# Patient Record
Sex: MALE | Race: ASIAN | NOT HISPANIC OR LATINO | Employment: OTHER | URBAN - METROPOLITAN AREA
[De-identification: names, ages, dates, MRNs, and addresses within clinical notes are randomized per-mention and may not be internally consistent; named-entity substitution may affect disease eponyms.]

---

## 2021-01-17 ENCOUNTER — HOSPITAL ENCOUNTER (EMERGENCY)
Facility: CLINIC | Age: 71
Discharge: HOME | End: 2021-01-17
Attending: EMERGENCY MEDICINE | Admitting: EMERGENCY MEDICINE
Payer: COMMERCIAL

## 2021-01-17 ENCOUNTER — APPOINTMENT (OUTPATIENT)
Dept: GENERAL RADIOLOGY | Facility: CLINIC | Age: 71
End: 2021-01-17
Attending: EMERGENCY MEDICINE
Payer: COMMERCIAL

## 2021-01-17 VITALS
DIASTOLIC BLOOD PRESSURE: 103 MMHG | RESPIRATION RATE: 20 BRPM | OXYGEN SATURATION: 96 % | HEART RATE: 112 BPM | TEMPERATURE: 98.3 F | SYSTOLIC BLOOD PRESSURE: 141 MMHG

## 2021-01-17 DIAGNOSIS — S10.11XA: ICD-10-CM

## 2021-01-17 PROCEDURE — 99285 EMERGENCY DEPT VISIT HI MDM: CPT | Mod: 25

## 2021-01-17 PROCEDURE — 70360 X-RAY EXAM OF NECK: CPT

## 2021-01-17 PROCEDURE — 31525 DX LARYNGOSCOPY EXCL NB: CPT

## 2021-01-17 ASSESSMENT — ENCOUNTER SYMPTOMS: TROUBLE SWALLOWING: 0

## 2021-01-18 NOTE — ED PROVIDER NOTES
History   Chief Complaint:  Swallowed Foreign Body       HPI     A phone  was used obtain the below history as well as to explain diagnosis, plan of treatment, reasons to return to the emergency department and appropriate follow up.  ID: [id number]     Anusha Stallings is a 70 year old male who presents with a swallowed foreign body. The patient states that a fish bone got stuck in his throat while eating dinner tonight. He is still able to swallow liquids without difficulty but he can feel the bone when he swallows. This has never happened to him before.     Review of Systems   HENT: Negative for trouble swallowing.    All other systems reviewed and are negative.      Allergies:  The patient has no known allergies.     Medications:  The patient is currently on no regular medications.    Past Medical History:    The patient denies past medical history.     Social History:  Presents to the ED: Unaccompanied   Preferred Language: Cantonese      Physical Exam     Patient Vitals for the past 24 hrs:   BP Temp Temp src Pulse Resp SpO2   01/17/21 1933 (!) 141/103 98.3  F (36.8  C) Oral 112 20 96 %       Physical Exam  HENT:      Head: Normocephalic.      Right Ear: Tympanic membrane normal.      Left Ear: Tympanic membrane normal.      Mouth/Throat:      Mouth: Mucous membranes are moist.      Comments: There is a small hematoma associated with the right peritonsillar pillar.  There is no active bleeding no obvious fishbone seen.  Eyes:      Pupils: Pupils are equal, round, and reactive to light.   Cardiovascular:      Rate and Rhythm: Normal rate.   Pulmonary:      Effort: Pulmonary effort is normal.      Breath sounds: No stridor.   Abdominal:      General: Abdomen is flat.   Neurological:      Mental Status: He is alert.           Emergency Department Course     Imaging:    XR Neck Soft Tissue:   No definite radiopaque foreign body identified to correlate to the patient's reported fishbone. If there are  persistent concerns for foreign body or soft tissue neck abnormality, CT scan would be suggested. Prevertebral soft tissues are normal in thickness. Mild reversal of the normal cervical lordosis. Mild cervical spondylosis. Epiglottis is normal, as per radiology.       Procedures    Laryngoscopy     PHYSICIAN:      INDICATION:  Fish bone    MEDICATIONS: hurricaine spray     CONSENT: Verbal consent was obtained from the patient.  The fiberoptic scope was advanced through the mouth down to the level of the glottic structures.    FINDINGS:   1.  The epiglottis is normal  2..  No foreign body is noted.    OUTCOME: There were no complications to the procedure.  The patient tolerated the procedure relatively well.       Emergency Department Course:    Reviewed:  I reviewed the patient's nursing notes and vitals.     Assessments:  2037 I obtained history and examined the patient as noted above.   2117 I rechecked the patient, explained findings, and performed a fiber optic exam.     Disposition:  The patient was discharged to home.       Impression & Plan     Medical Decision Making:  Patient presents with foreign body sensation in the throat with a history of a possible fishbone stuck patient is able to drink water phonation is normal without signs of stridor or difficulty with talking.  X-rays are negative for bone and therefore direct laryngoscopy was also performed patient was a challenging direct laryngoscopy due to a very sensitive gag reflex but visualized part of the oropharynx clipping the epiglottis were normal without signs of bone retention.  Patient was offered reassurance Magic mouthwash and follow-up with ENT for recheck if symptoms continue.  She did symptomatically improve after Hurricaine spray was placed care was discussed with him in follow-up using Cantonese .    Diagnosis:    ICD-10-CM    1. Abrasion of oropharynx, initial encounter  S10.11XA        Discharge Medications:  Discharge  Medication List as of 1/17/2021  9:55 PM      START taking these medications    Details   magic mouthwash (ENTER INGREDIENTS IN COMMENTS) suspension Take 5 mLs by mouth every 4 hours as needed (throat pain), Disp-240 mL, R-0, Local [id number] maalox, 60 viscous lidocaine, 60 benadryl             Scribe Disclosure:  I, Jing Jacinto, am serving as a scribe at 8:36 PM on 1/17/2021 to document services personally performed by Gaston Hammond MD based on my observations and the provider's statements to me.        Gaston Hammond MD  01/19/21 0030

## 2021-01-18 NOTE — DISCHARGE INSTRUCTIONS
Your x-ray and fiberoptic scope shows no clear bone.  Please use Magic mouthwash gargle and spit.  Please follow-up with ENT if symptoms do not improve with time.

## 2021-01-18 NOTE — ED TRIAGE NOTES
Pt states fish bone stuck in throat tonight after eating dinner. Pt able to drink water without difficulty. ABCs intact GCS 15

## 2023-03-21 RX ORDER — ATENOLOL 25 MG/1
1 TABLET ORAL DAILY
COMMUNITY
Start: 2022-08-15

## 2023-03-21 RX ORDER — FINASTERIDE 5 MG/1
5 TABLET, FILM COATED ORAL DAILY
COMMUNITY
Start: 2023-02-17 | End: 2024-02-17

## 2023-03-21 RX ORDER — OXYCODONE HYDROCHLORIDE 5 MG/1
5-10 TABLET ORAL EVERY 6 HOURS PRN
Status: ON HOLD | COMMUNITY
Start: 2023-01-25 | End: 2023-03-30

## 2023-03-21 RX ORDER — DOXAZOSIN 2 MG/1
1 TABLET ORAL DAILY
COMMUNITY
Start: 2022-10-25

## 2023-03-21 RX ORDER — GABAPENTIN 300 MG/1
300 CAPSULE ORAL 3 TIMES DAILY
Status: ON HOLD | COMMUNITY
Start: 2023-01-24 | End: 2023-03-30

## 2023-03-21 RX ORDER — AMLODIPINE BESYLATE 2.5 MG/1
2.5 TABLET ORAL DAILY
COMMUNITY
Start: 2022-08-15

## 2023-03-27 NOTE — PHARMACY-ADMISSION MEDICATION HISTORY
Pharmacy reviewed prior to admission med list from pre-admitting rn, [facility].        Prior to Admission medications    Medication Sig Last Dose Taking? Auth Provider Long Term End Date   amLODIPine (NORVASC) 2.5 MG tablet Take 2.5 mg by mouth daily  Yes Reported, Patient Yes    atenolol (TENORMIN) 25 MG tablet Take 1 tablet by mouth daily  Yes Reported, Patient Yes    doxazosin (CARDURA) 2 MG tablet Take 1 tablet by mouth daily  Yes Reported, Patient Yes    finasteride (PROSCAR) 5 MG tablet Take 5 mg by mouth daily  Yes Reported, Patient  2/17/24   gabapentin (NEURONTIN) 300 MG capsule Take 300 mg by mouth 3 times daily  Yes Reported, Patient Yes 1/24/24   oxyCODONE (ROXICODONE) 5 MG tablet Take 5-10 mg by mouth every 6 hours as needed  Yes Reported, Patient

## 2023-03-28 ENCOUNTER — ANESTHESIA EVENT (OUTPATIENT)
Dept: SURGERY | Facility: CLINIC | Age: 73
DRG: 460 | End: 2023-03-28
Payer: COMMERCIAL

## 2023-03-28 ENCOUNTER — APPOINTMENT (OUTPATIENT)
Dept: GENERAL RADIOLOGY | Facility: CLINIC | Age: 73
DRG: 460 | End: 2023-03-28
Attending: ORTHOPAEDIC SURGERY
Payer: COMMERCIAL

## 2023-03-28 ENCOUNTER — ANESTHESIA (OUTPATIENT)
Dept: SURGERY | Facility: CLINIC | Age: 73
DRG: 460 | End: 2023-03-28
Payer: COMMERCIAL

## 2023-03-28 ENCOUNTER — HOSPITAL ENCOUNTER (INPATIENT)
Facility: CLINIC | Age: 73
LOS: 3 days | Discharge: HOME HEALTH | DRG: 460 | End: 2023-03-31
Attending: ORTHOPAEDIC SURGERY | Admitting: ORTHOPAEDIC SURGERY
Payer: COMMERCIAL

## 2023-03-28 DIAGNOSIS — Z98.1 S/P LUMBAR FUSION: Primary | ICD-10-CM

## 2023-03-28 LAB — POTASSIUM SERPL-SCNC: 3.8 MMOL/L (ref 3.4–5.3)

## 2023-03-28 PROCEDURE — C1713 ANCHOR/SCREW BN/BN,TIS/BN: HCPCS | Performed by: ORTHOPAEDIC SURGERY

## 2023-03-28 PROCEDURE — 258N000003 HC RX IP 258 OP 636: Performed by: PHYSICIAN ASSISTANT

## 2023-03-28 PROCEDURE — 250N000011 HC RX IP 250 OP 636: Performed by: ANESTHESIOLOGY

## 2023-03-28 PROCEDURE — 0SG0071 FUSION OF LUMBAR VERTEBRAL JOINT WITH AUTOLOGOUS TISSUE SUBSTITUTE, POSTERIOR APPROACH, POSTERIOR COLUMN, OPEN APPROACH: ICD-10-PCS | Performed by: ORTHOPAEDIC SURGERY

## 2023-03-28 PROCEDURE — 84132 ASSAY OF SERUM POTASSIUM: CPT | Performed by: ANESTHESIOLOGY

## 2023-03-28 PROCEDURE — 250N000013 HC RX MED GY IP 250 OP 250 PS 637: Performed by: PHYSICIAN ASSISTANT

## 2023-03-28 PROCEDURE — 36415 COLL VENOUS BLD VENIPUNCTURE: CPT | Performed by: ANESTHESIOLOGY

## 2023-03-28 PROCEDURE — 250N000025 HC SEVOFLURANE, PER MIN: Performed by: ORTHOPAEDIC SURGERY

## 2023-03-28 PROCEDURE — 258N000003 HC RX IP 258 OP 636: Performed by: NURSE ANESTHETIST, CERTIFIED REGISTERED

## 2023-03-28 PROCEDURE — 272N000001 HC OR GENERAL SUPPLY STERILE: Performed by: ORTHOPAEDIC SURGERY

## 2023-03-28 PROCEDURE — 250N000011 HC RX IP 250 OP 636: Performed by: NURSE ANESTHETIST, CERTIFIED REGISTERED

## 2023-03-28 PROCEDURE — C1762 CONN TISS, HUMAN(INC FASCIA): HCPCS | Performed by: ORTHOPAEDIC SURGERY

## 2023-03-28 PROCEDURE — 250N000009 HC RX 250: Performed by: NURSE ANESTHETIST, CERTIFIED REGISTERED

## 2023-03-28 PROCEDURE — 120N000001 HC R&B MED SURG/OB

## 2023-03-28 PROCEDURE — 710N000009 HC RECOVERY PHASE 1, LEVEL 1, PER MIN: Performed by: ORTHOPAEDIC SURGERY

## 2023-03-28 PROCEDURE — 272N000004 HC RX 272: Performed by: ORTHOPAEDIC SURGERY

## 2023-03-28 PROCEDURE — 8E0WXBF COMPUTER ASSISTED PROCEDURE OF TRUNK REGION, WITH FLUOROSCOPY: ICD-10-PCS | Performed by: ORTHOPAEDIC SURGERY

## 2023-03-28 PROCEDURE — 01NB0ZZ RELEASE LUMBAR NERVE, OPEN APPROACH: ICD-10-PCS | Performed by: ORTHOPAEDIC SURGERY

## 2023-03-28 PROCEDURE — 250N000011 HC RX IP 250 OP 636: Performed by: PHYSICIAN ASSISTANT

## 2023-03-28 PROCEDURE — 250N000009 HC RX 250: Performed by: ORTHOPAEDIC SURGERY

## 2023-03-28 PROCEDURE — 370N000017 HC ANESTHESIA TECHNICAL FEE, PER MIN: Performed by: ORTHOPAEDIC SURGERY

## 2023-03-28 PROCEDURE — 999N000179 XR SURGERY CARM FLUORO LESS THAN 5 MIN W STILLS: Mod: TC

## 2023-03-28 PROCEDURE — 999N000141 HC STATISTIC PRE-PROCEDURE NURSING ASSESSMENT: Performed by: ORTHOPAEDIC SURGERY

## 2023-03-28 PROCEDURE — 360N000085 HC SURGERY LEVEL 5 W/ FLUORO, PER MIN: Performed by: ORTHOPAEDIC SURGERY

## 2023-03-28 PROCEDURE — 258N000003 HC RX IP 258 OP 636: Performed by: ANESTHESIOLOGY

## 2023-03-28 PROCEDURE — 250N000009 HC RX 250: Performed by: PHYSICIAN ASSISTANT

## 2023-03-28 PROCEDURE — 258N000003 HC RX IP 258 OP 636: Performed by: ORTHOPAEDIC SURGERY

## 2023-03-28 DEVICE — IMP SCR MEDT 5.5/6.0MM SOLERA 6.5X40MM MA 55840006540: Type: IMPLANTABLE DEVICE | Site: SPINE LUMBAR | Status: FUNCTIONAL

## 2023-03-28 DEVICE — IMP ROD MEDT SOLERA CVD 5.5X35MM TI 1553201035: Type: IMPLANTABLE DEVICE | Site: SPINE LUMBAR | Status: FUNCTIONAL

## 2023-03-28 DEVICE — IMP SCR SET MEDT SOLERA BREAK OFF 5.5MM TI 5540030: Type: IMPLANTABLE DEVICE | Site: SPINE LUMBAR | Status: FUNCTIONAL

## 2023-03-28 DEVICE — DBM 7509211 MAGNIFUSE 1 X 10CM
Type: IMPLANTABLE DEVICE | Site: SPINE LUMBAR | Status: FUNCTIONAL
Brand: MAGNIFUSE® BONE GRAFT

## 2023-03-28 RX ORDER — METHOCARBAMOL 500 MG/1
500 TABLET, FILM COATED ORAL EVERY 6 HOURS PRN
Status: DISCONTINUED | OUTPATIENT
Start: 2023-03-28 | End: 2023-03-31 | Stop reason: HOSPADM

## 2023-03-28 RX ORDER — ONDANSETRON 2 MG/ML
4 INJECTION INTRAMUSCULAR; INTRAVENOUS EVERY 6 HOURS PRN
Status: DISCONTINUED | OUTPATIENT
Start: 2023-03-28 | End: 2023-03-31 | Stop reason: HOSPADM

## 2023-03-28 RX ORDER — EPHEDRINE SULFATE 50 MG/ML
INJECTION, SOLUTION INTRAMUSCULAR; INTRAVENOUS; SUBCUTANEOUS PRN
Status: DISCONTINUED | OUTPATIENT
Start: 2023-03-28 | End: 2023-03-28

## 2023-03-28 RX ORDER — OXYCODONE HYDROCHLORIDE 5 MG/1
10 TABLET ORAL EVERY 4 HOURS PRN
Status: DISCONTINUED | OUTPATIENT
Start: 2023-03-28 | End: 2023-03-31 | Stop reason: HOSPADM

## 2023-03-28 RX ORDER — GLYCOPYRROLATE 0.2 MG/ML
INJECTION, SOLUTION INTRAMUSCULAR; INTRAVENOUS PRN
Status: DISCONTINUED | OUTPATIENT
Start: 2023-03-28 | End: 2023-03-28

## 2023-03-28 RX ORDER — HYDROMORPHONE HCL IN WATER/PF 6 MG/30 ML
0.4 PATIENT CONTROLLED ANALGESIA SYRINGE INTRAVENOUS
Status: DISCONTINUED | OUTPATIENT
Start: 2023-03-28 | End: 2023-03-31 | Stop reason: HOSPADM

## 2023-03-28 RX ORDER — NALOXONE HYDROCHLORIDE 0.4 MG/ML
0.2 INJECTION, SOLUTION INTRAMUSCULAR; INTRAVENOUS; SUBCUTANEOUS
Status: DISCONTINUED | OUTPATIENT
Start: 2023-03-28 | End: 2023-03-31 | Stop reason: HOSPADM

## 2023-03-28 RX ORDER — CEFAZOLIN SODIUM/WATER 2 G/20 ML
2 SYRINGE (ML) INTRAVENOUS SEE ADMIN INSTRUCTIONS
Status: DISCONTINUED | OUTPATIENT
Start: 2023-03-28 | End: 2023-03-28 | Stop reason: HOSPADM

## 2023-03-28 RX ORDER — NEOSTIGMINE METHYLSULFATE 1 MG/ML
VIAL (ML) INJECTION PRN
Status: DISCONTINUED | OUTPATIENT
Start: 2023-03-28 | End: 2023-03-28

## 2023-03-28 RX ORDER — CEFAZOLIN SODIUM/WATER 2 G/20 ML
2 SYRINGE (ML) INTRAVENOUS
Status: COMPLETED | OUTPATIENT
Start: 2023-03-28 | End: 2023-03-28

## 2023-03-28 RX ORDER — HYDROMORPHONE HCL IN WATER/PF 6 MG/30 ML
0.2 PATIENT CONTROLLED ANALGESIA SYRINGE INTRAVENOUS
Status: DISCONTINUED | OUTPATIENT
Start: 2023-03-28 | End: 2023-03-31 | Stop reason: HOSPADM

## 2023-03-28 RX ORDER — ONDANSETRON 2 MG/ML
4 INJECTION INTRAMUSCULAR; INTRAVENOUS EVERY 30 MIN PRN
Status: DISCONTINUED | OUTPATIENT
Start: 2023-03-28 | End: 2023-03-28 | Stop reason: HOSPADM

## 2023-03-28 RX ORDER — HYDROXYZINE HYDROCHLORIDE 10 MG/1
10 TABLET, FILM COATED ORAL EVERY 6 HOURS PRN
Status: DISCONTINUED | OUTPATIENT
Start: 2023-03-28 | End: 2023-03-31 | Stop reason: HOSPADM

## 2023-03-28 RX ORDER — LABETALOL HYDROCHLORIDE 5 MG/ML
10 INJECTION, SOLUTION INTRAVENOUS EVERY 10 MIN PRN
Status: DISCONTINUED | OUTPATIENT
Start: 2023-03-28 | End: 2023-03-28 | Stop reason: HOSPADM

## 2023-03-28 RX ORDER — MAGNESIUM HYDROXIDE 1200 MG/15ML
LIQUID ORAL PRN
Status: DISCONTINUED | OUTPATIENT
Start: 2023-03-28 | End: 2023-03-28 | Stop reason: HOSPADM

## 2023-03-28 RX ORDER — ACETAMINOPHEN 325 MG/1
975 TABLET ORAL EVERY 8 HOURS
Status: COMPLETED | OUTPATIENT
Start: 2023-03-28 | End: 2023-03-31

## 2023-03-28 RX ORDER — SODIUM CHLORIDE, SODIUM LACTATE, POTASSIUM CHLORIDE, CALCIUM CHLORIDE 600; 310; 30; 20 MG/100ML; MG/100ML; MG/100ML; MG/100ML
INJECTION, SOLUTION INTRAVENOUS CONTINUOUS PRN
Status: DISCONTINUED | OUTPATIENT
Start: 2023-03-28 | End: 2023-03-28

## 2023-03-28 RX ORDER — TRANEXAMIC ACID 10 MG/ML
1 INJECTION, SOLUTION INTRAVENOUS ONCE
Status: COMPLETED | OUTPATIENT
Start: 2023-03-28 | End: 2023-03-28

## 2023-03-28 RX ORDER — SODIUM CHLORIDE, SODIUM LACTATE, POTASSIUM CHLORIDE, CALCIUM CHLORIDE 600; 310; 30; 20 MG/100ML; MG/100ML; MG/100ML; MG/100ML
INJECTION, SOLUTION INTRAVENOUS CONTINUOUS
Status: DISCONTINUED | OUTPATIENT
Start: 2023-03-28 | End: 2023-03-28 | Stop reason: HOSPADM

## 2023-03-28 RX ORDER — GABAPENTIN 100 MG/1
100 CAPSULE ORAL
Status: COMPLETED | OUTPATIENT
Start: 2023-03-28 | End: 2023-03-28

## 2023-03-28 RX ORDER — CEFAZOLIN SODIUM/WATER 2 G/20 ML
2 SYRINGE (ML) INTRAVENOUS EVERY 8 HOURS
Status: DISCONTINUED | OUTPATIENT
Start: 2023-03-28 | End: 2023-03-28

## 2023-03-28 RX ORDER — PROPOFOL 10 MG/ML
INJECTION, EMULSION INTRAVENOUS PRN
Status: DISCONTINUED | OUTPATIENT
Start: 2023-03-28 | End: 2023-03-28

## 2023-03-28 RX ORDER — VITAMIN B COMPLEX
25 TABLET ORAL 2 TIMES DAILY
Status: DISCONTINUED | OUTPATIENT
Start: 2023-03-28 | End: 2023-03-31 | Stop reason: HOSPADM

## 2023-03-28 RX ORDER — AMLODIPINE BESYLATE 2.5 MG/1
2.5 TABLET ORAL DAILY
Status: DISCONTINUED | OUTPATIENT
Start: 2023-03-29 | End: 2023-03-31 | Stop reason: HOSPADM

## 2023-03-28 RX ORDER — BISACODYL 10 MG
10 SUPPOSITORY, RECTAL RECTAL DAILY PRN
Status: DISCONTINUED | OUTPATIENT
Start: 2023-03-28 | End: 2023-03-31 | Stop reason: HOSPADM

## 2023-03-28 RX ORDER — DEXAMETHASONE SODIUM PHOSPHATE 4 MG/ML
4 INJECTION, SOLUTION INTRA-ARTICULAR; INTRALESIONAL; INTRAMUSCULAR; INTRAVENOUS; SOFT TISSUE
Status: DISCONTINUED | OUTPATIENT
Start: 2023-03-28 | End: 2023-03-28 | Stop reason: HOSPADM

## 2023-03-28 RX ORDER — FENTANYL CITRATE 50 UG/ML
INJECTION, SOLUTION INTRAMUSCULAR; INTRAVENOUS PRN
Status: DISCONTINUED | OUTPATIENT
Start: 2023-03-28 | End: 2023-03-28

## 2023-03-28 RX ORDER — CEFAZOLIN SODIUM 2 G/100ML
2 INJECTION, SOLUTION INTRAVENOUS EVERY 8 HOURS
Status: COMPLETED | OUTPATIENT
Start: 2023-03-29 | End: 2023-03-29

## 2023-03-28 RX ORDER — BUPIVACAINE HYDROCHLORIDE AND EPINEPHRINE 2.5; 5 MG/ML; UG/ML
INJECTION, SOLUTION EPIDURAL; INFILTRATION; INTRACAUDAL; PERINEURAL PRN
Status: DISCONTINUED | OUTPATIENT
Start: 2023-03-28 | End: 2023-03-28 | Stop reason: HOSPADM

## 2023-03-28 RX ORDER — NALOXONE HYDROCHLORIDE 0.4 MG/ML
0.4 INJECTION, SOLUTION INTRAMUSCULAR; INTRAVENOUS; SUBCUTANEOUS
Status: DISCONTINUED | OUTPATIENT
Start: 2023-03-28 | End: 2023-03-31 | Stop reason: HOSPADM

## 2023-03-28 RX ORDER — ONDANSETRON 4 MG/1
4 TABLET, ORALLY DISINTEGRATING ORAL EVERY 30 MIN PRN
Status: DISCONTINUED | OUTPATIENT
Start: 2023-03-28 | End: 2023-03-28 | Stop reason: HOSPADM

## 2023-03-28 RX ORDER — FINASTERIDE 5 MG/1
5 TABLET, FILM COATED ORAL DAILY
Status: DISCONTINUED | OUTPATIENT
Start: 2023-03-29 | End: 2023-03-31 | Stop reason: HOSPADM

## 2023-03-28 RX ORDER — OXYCODONE HYDROCHLORIDE 5 MG/1
5 TABLET ORAL EVERY 4 HOURS PRN
Status: DISCONTINUED | OUTPATIENT
Start: 2023-03-28 | End: 2023-03-31 | Stop reason: HOSPADM

## 2023-03-28 RX ORDER — ONDANSETRON 4 MG/1
4 TABLET, ORALLY DISINTEGRATING ORAL EVERY 6 HOURS PRN
Status: DISCONTINUED | OUTPATIENT
Start: 2023-03-28 | End: 2023-03-31 | Stop reason: HOSPADM

## 2023-03-28 RX ORDER — ALBUTEROL SULFATE 0.83 MG/ML
2.5 SOLUTION RESPIRATORY (INHALATION) EVERY 4 HOURS PRN
Status: DISCONTINUED | OUTPATIENT
Start: 2023-03-28 | End: 2023-03-28 | Stop reason: HOSPADM

## 2023-03-28 RX ORDER — FENTANYL CITRATE 50 UG/ML
50 INJECTION, SOLUTION INTRAMUSCULAR; INTRAVENOUS EVERY 5 MIN PRN
Status: DISCONTINUED | OUTPATIENT
Start: 2023-03-28 | End: 2023-03-28 | Stop reason: HOSPADM

## 2023-03-28 RX ORDER — MEPERIDINE HYDROCHLORIDE 25 MG/ML
12.5 INJECTION INTRAMUSCULAR; INTRAVENOUS; SUBCUTANEOUS EVERY 5 MIN PRN
Status: DISCONTINUED | OUTPATIENT
Start: 2023-03-28 | End: 2023-03-28 | Stop reason: HOSPADM

## 2023-03-28 RX ORDER — PROPOFOL 10 MG/ML
INJECTION, EMULSION INTRAVENOUS CONTINUOUS PRN
Status: DISCONTINUED | OUTPATIENT
Start: 2023-03-28 | End: 2023-03-28

## 2023-03-28 RX ORDER — LIDOCAINE HYDROCHLORIDE 20 MG/ML
INJECTION, SOLUTION INFILTRATION; PERINEURAL PRN
Status: DISCONTINUED | OUTPATIENT
Start: 2023-03-28 | End: 2023-03-28

## 2023-03-28 RX ORDER — PROCHLORPERAZINE MALEATE 5 MG
5 TABLET ORAL EVERY 6 HOURS PRN
Status: DISCONTINUED | OUTPATIENT
Start: 2023-03-28 | End: 2023-03-31 | Stop reason: HOSPADM

## 2023-03-28 RX ORDER — ACETAMINOPHEN 325 MG/1
650 TABLET ORAL EVERY 4 HOURS PRN
Status: DISCONTINUED | OUTPATIENT
Start: 2023-03-31 | End: 2023-03-31 | Stop reason: HOSPADM

## 2023-03-28 RX ORDER — AMOXICILLIN 250 MG
1 CAPSULE ORAL 2 TIMES DAILY
Status: DISCONTINUED | OUTPATIENT
Start: 2023-03-28 | End: 2023-03-31 | Stop reason: HOSPADM

## 2023-03-28 RX ORDER — FENTANYL CITRATE 50 UG/ML
25 INJECTION, SOLUTION INTRAMUSCULAR; INTRAVENOUS EVERY 5 MIN PRN
Status: DISCONTINUED | OUTPATIENT
Start: 2023-03-28 | End: 2023-03-28 | Stop reason: HOSPADM

## 2023-03-28 RX ORDER — DOXAZOSIN 1 MG/1
2 TABLET ORAL DAILY
Status: DISCONTINUED | OUTPATIENT
Start: 2023-03-29 | End: 2023-03-31 | Stop reason: HOSPADM

## 2023-03-28 RX ORDER — LIDOCAINE 40 MG/G
CREAM TOPICAL
Status: DISCONTINUED | OUTPATIENT
Start: 2023-03-28 | End: 2023-03-28 | Stop reason: HOSPADM

## 2023-03-28 RX ORDER — SODIUM CHLORIDE 9 MG/ML
INJECTION, SOLUTION INTRAVENOUS CONTINUOUS
Status: DISCONTINUED | OUTPATIENT
Start: 2023-03-28 | End: 2023-03-31 | Stop reason: HOSPADM

## 2023-03-28 RX ORDER — ATENOLOL 25 MG/1
25 TABLET ORAL DAILY
Status: DISCONTINUED | OUTPATIENT
Start: 2023-03-29 | End: 2023-03-31 | Stop reason: HOSPADM

## 2023-03-28 RX ORDER — POLYETHYLENE GLYCOL 3350 17 G/17G
17 POWDER, FOR SOLUTION ORAL DAILY
Status: DISCONTINUED | OUTPATIENT
Start: 2023-03-29 | End: 2023-03-31 | Stop reason: HOSPADM

## 2023-03-28 RX ORDER — DEXAMETHASONE SODIUM PHOSPHATE 4 MG/ML
INJECTION, SOLUTION INTRA-ARTICULAR; INTRALESIONAL; INTRAMUSCULAR; INTRAVENOUS; SOFT TISSUE PRN
Status: DISCONTINUED | OUTPATIENT
Start: 2023-03-28 | End: 2023-03-28

## 2023-03-28 RX ORDER — LIDOCAINE 40 MG/G
CREAM TOPICAL
Status: DISCONTINUED | OUTPATIENT
Start: 2023-03-28 | End: 2023-03-31 | Stop reason: HOSPADM

## 2023-03-28 RX ORDER — ONDANSETRON 2 MG/ML
INJECTION INTRAMUSCULAR; INTRAVENOUS PRN
Status: DISCONTINUED | OUTPATIENT
Start: 2023-03-28 | End: 2023-03-28

## 2023-03-28 RX ORDER — DIAZEPAM 10 MG/2ML
2.5 INJECTION, SOLUTION INTRAMUSCULAR; INTRAVENOUS
Status: DISCONTINUED | OUTPATIENT
Start: 2023-03-28 | End: 2023-03-28 | Stop reason: HOSPADM

## 2023-03-28 RX ADMIN — FENTANYL CITRATE 50 MCG: 50 INJECTION INTRAMUSCULAR; INTRAVENOUS at 11:25

## 2023-03-28 RX ADMIN — Medication 25 MCG: at 19:46

## 2023-03-28 RX ADMIN — Medication 1 TABLET: at 18:35

## 2023-03-28 RX ADMIN — ACETAMINOPHEN 975 MG: 325 TABLET, FILM COATED ORAL at 18:35

## 2023-03-28 RX ADMIN — FENTANYL CITRATE 50 MCG: 50 INJECTION, SOLUTION INTRAMUSCULAR; INTRAVENOUS at 08:27

## 2023-03-28 RX ADMIN — PHENYLEPHRINE HYDROCHLORIDE 0.2 MCG/KG/MIN: 10 INJECTION INTRAVENOUS at 08:55

## 2023-03-28 RX ADMIN — TRANEXAMIC ACID 1 G: 10 INJECTION, SOLUTION INTRAVENOUS at 08:24

## 2023-03-28 RX ADMIN — PROPOFOL 150 MG: 10 INJECTION, EMULSION INTRAVENOUS at 08:16

## 2023-03-28 RX ADMIN — SODIUM CHLORIDE, POTASSIUM CHLORIDE, SODIUM LACTATE AND CALCIUM CHLORIDE: 600; 310; 30; 20 INJECTION, SOLUTION INTRAVENOUS at 07:15

## 2023-03-28 RX ADMIN — HYDROMORPHONE HYDROCHLORIDE 0.5 MG: 1 INJECTION, SOLUTION INTRAMUSCULAR; INTRAVENOUS; SUBCUTANEOUS at 10:26

## 2023-03-28 RX ADMIN — GABAPENTIN 100 MG: 100 CAPSULE ORAL at 07:32

## 2023-03-28 RX ADMIN — ROCURONIUM BROMIDE 40 MG: 50 INJECTION, SOLUTION INTRAVENOUS at 08:23

## 2023-03-28 RX ADMIN — ONDANSETRON 4 MG: 2 INJECTION INTRAMUSCULAR; INTRAVENOUS at 10:26

## 2023-03-28 RX ADMIN — DEXAMETHASONE SODIUM PHOSPHATE 8 MG: 4 INJECTION, SOLUTION INTRA-ARTICULAR; INTRALESIONAL; INTRAMUSCULAR; INTRAVENOUS; SOFT TISSUE at 08:17

## 2023-03-28 RX ADMIN — FENTANYL CITRATE 50 MCG: 50 INJECTION, SOLUTION INTRAMUSCULAR; INTRAVENOUS at 08:16

## 2023-03-28 RX ADMIN — Medication 2 G: at 08:09

## 2023-03-28 RX ADMIN — PROPOFOL 50 MCG/KG/MIN: 10 INJECTION, EMULSION INTRAVENOUS at 08:28

## 2023-03-28 RX ADMIN — SODIUM CHLORIDE, POTASSIUM CHLORIDE, SODIUM LACTATE AND CALCIUM CHLORIDE: 600; 310; 30; 20 INJECTION, SOLUTION INTRAVENOUS at 10:37

## 2023-03-28 RX ADMIN — Medication 7.5 MG: at 10:39

## 2023-03-28 RX ADMIN — ROCURONIUM BROMIDE 20 MG: 50 INJECTION, SOLUTION INTRAVENOUS at 09:00

## 2023-03-28 RX ADMIN — Medication 2 G: at 16:00

## 2023-03-28 RX ADMIN — HYDROMORPHONE HYDROCHLORIDE 0.5 MG: 1 INJECTION, SOLUTION INTRAMUSCULAR; INTRAVENOUS; SUBCUTANEOUS at 08:40

## 2023-03-28 RX ADMIN — SENNOSIDES AND DOCUSATE SODIUM 1 TABLET: 8.6; 5 TABLET ORAL at 19:46

## 2023-03-28 RX ADMIN — HYDROMORPHONE HYDROCHLORIDE 0.3 MG: 1 INJECTION, SOLUTION INTRAMUSCULAR; INTRAVENOUS; SUBCUTANEOUS at 10:37

## 2023-03-28 RX ADMIN — ROCURONIUM BROMIDE 10 MG: 50 INJECTION, SOLUTION INTRAVENOUS at 08:16

## 2023-03-28 RX ADMIN — SODIUM CHLORIDE, POTASSIUM CHLORIDE, SODIUM LACTATE AND CALCIUM CHLORIDE: 600; 310; 30; 20 INJECTION, SOLUTION INTRAVENOUS at 13:07

## 2023-03-28 RX ADMIN — PHENYLEPHRINE HYDROCHLORIDE 50 MCG: 10 INJECTION INTRAVENOUS at 08:33

## 2023-03-28 RX ADMIN — LIDOCAINE HYDROCHLORIDE 50 MG: 20 INJECTION, SOLUTION INFILTRATION; PERINEURAL at 08:16

## 2023-03-28 RX ADMIN — PHENYLEPHRINE HYDROCHLORIDE 100 MCG: 10 INJECTION INTRAVENOUS at 08:53

## 2023-03-28 RX ADMIN — MIDAZOLAM 2 MG: 1 INJECTION INTRAMUSCULAR; INTRAVENOUS at 08:10

## 2023-03-28 RX ADMIN — NEOSTIGMINE METHYLSULFATE 5 MG: 1 INJECTION, SOLUTION INTRAVENOUS at 10:29

## 2023-03-28 RX ADMIN — ROCURONIUM BROMIDE 20 MG: 50 INJECTION, SOLUTION INTRAVENOUS at 09:26

## 2023-03-28 RX ADMIN — Medication 10 MG: at 09:22

## 2023-03-28 RX ADMIN — SODIUM CHLORIDE, POTASSIUM CHLORIDE, SODIUM LACTATE AND CALCIUM CHLORIDE: 600; 310; 30; 20 INJECTION, SOLUTION INTRAVENOUS at 08:54

## 2023-03-28 RX ADMIN — PHENYLEPHRINE HYDROCHLORIDE 100 MCG: 10 INJECTION INTRAVENOUS at 08:46

## 2023-03-28 RX ADMIN — PHENYLEPHRINE HYDROCHLORIDE 100 MCG: 10 INJECTION INTRAVENOUS at 08:55

## 2023-03-28 RX ADMIN — GLYCOPYRROLATE 0.7 MG: 0.2 INJECTION, SOLUTION INTRAMUSCULAR; INTRAVENOUS at 10:29

## 2023-03-28 RX ADMIN — SODIUM CHLORIDE: 9 INJECTION, SOLUTION INTRAVENOUS at 18:25

## 2023-03-28 ASSESSMENT — ACTIVITIES OF DAILY LIVING (ADL)
ADLS_ACUITY_SCORE: 20

## 2023-03-28 NOTE — BRIEF OP NOTE
[facility] Brief Operative Note    Pre-operative diagnosis: L45 Spinal stenosis, lumbar region, without neurogenic claudication [M48.061]  Lumbar radiculopathy [M54.16]  Spondylolisthesis of lumbar region [M43.16]  Lumbar spondylosis [M47.816]   Post-operative diagnosis * No post-op diagnosis entered *  Very tall L45 disc space     Procedure: Procedure(s):  LUMBAR 4 TO 5 POSTERIOR LUMBAR INSTRUMENTED FUSION WITHOUT INTERBODY CAGE AND WITH LAMINECTOMIES   Surgeon(s): Surgeon(s) and Role:     * Rao Atkins MD - Primary     * Albin Madden PA-C - Assisting   Estimated blood loss: 50 mL    Specimens: * No specimens in log *   Findings: No comp    medtronic    Rao Atkins MD

## 2023-03-28 NOTE — ANESTHESIA PREPROCEDURE EVALUATION
Anesthesia Pre-Procedure Evaluation    Patient: Mahnaz Stallings   MRN: [id number] : 1950        Procedure : Procedure(s):  LUMBAR 4 TO 5 POSTERIOR LUMBAR INSTRUMENTED FUSION WITH OR WITHOUT INTERBODY CAGE AND WITH OR WITHOUT LAMINECTOMIES          Past Medical History:   Diagnosis Date     Hypertension      Pre-diabetes     HGB A1c was 6.4 in 2023     Tubular adenoma of colon       Past Surgical History:   Procedure Laterality Date     ORTHOPEDIC SURGERY Left     Left wrist fracture repaired      Allergies   Allergen Reactions     Lisinopril Cough     Medication stopped.  Cough resolved and has not returned.      Social History     Tobacco Use     Smoking status: Former     Types: Cigarettes     Quit date:      Years since quittin.2     Smokeless tobacco: Never   Substance Use Topics     Alcohol use: Not Currently      Wt Readings from Last 1 Encounters:   23 81.6 kg (180 lb)        Anesthesia Evaluation   Pt has had prior anesthetic. Type: General.    No history of anesthetic complications       ROS/MED HX  ENT/Pulmonary:  - neg pulmonary ROS     Neurologic:  - neg neurologic ROS     Cardiovascular:     (+) hypertension-----    METS/Exercise Tolerance:     Hematologic:  - neg hematologic  ROS     Musculoskeletal: Comment: Back issues      GI/Hepatic:  - neg GI/hepatic ROS     Renal/Genitourinary:  - neg Renal ROS     Endo: Comment: pre-diabetes      Psychiatric/Substance Use:  - neg psychiatric ROS     Infectious Disease:  - neg infectious disease ROS     Malignancy:  - neg malignancy ROS     Other:  - neg other ROS          Physical Exam    Airway        Mallampati: III   TM distance: > 3 FB   Neck ROM: full   Mouth opening: > 3 cm    Respiratory Devices and Support         Dental           Cardiovascular   cardiovascular exam normal          Pulmonary   pulmonary exam normal                OUTSIDE LABS:  CBC: No results found for: WBC, HGB, HCT, PLT  BMP: No results found for: NA, POTASSIUM,  CHLORIDE, CO2, BUN, CR, GLC  COAGS: No results found for: PTT, INR, FIBR  POC: No results found for: BGM, HCG, HCGS  HEPATIC: No results found for: ALBUMIN, PROTTOTAL, ALT, AST, GGT, ALKPHOS, BILITOTAL, BILIDIRECT, JOSEPH  OTHER: No results found for: PH, LACT, A1C, MARY ELLEN, PHOS, MAG, LIPASE, AMYLASE, TSH, T4, T3, CRP, SED    Anesthesia Plan    ASA Status:  2   NPO Status:  NPO Appropriate    Anesthesia Type: General.     - Airway: ETT   Induction: Intravenous, Propofol.   Maintenance: Balanced.        Consents    Anesthesia Plan(s) and associated risks, benefits, and realistic alternatives discussed. Questions answered and patient/representative(s) expressed understanding.    - Discussed:     - Discussed with:  Patient         Postoperative Care    Pain management: IV analgesics, Oral pain medications, Multi-modal analgesia.   PONV prophylaxis: Ondansetron (or other 5HT-3), Dexamethasone or Solumedrol     Comments:                Eloy Hardin MD

## 2023-03-28 NOTE — ANESTHESIA CARE TRANSFER NOTE
Patient: Mahnaz Stallings    Procedure: Procedure(s):  LUMBAR 4 TO 5 POSTERIOR LUMBAR INSTRUMENTED FUSION WITHOUT INTERBODY CAGE AND WITH LAMINECTOMIES       Diagnosis: Spinal stenosis, lumbar region, without neurogenic claudication [M48.061]  Lumbar radiculopathy [M54.16]  Spondylolisthesis of lumbar region [M43.16]  Lumbar spondylosis [M47.816]  Diagnosis Additional Information: No value filed.    Anesthesia Type:   General     Note:    Oropharynx: nasal airway in place  Level of Consciousness: drowsy  Oxygen Supplementation: face mask  Level of Supplemental Oxygen (L/min / FiO2): 6  Independent Airway: airway patency satisfactory and stable  Dentition: dentition unchanged  Vital Signs Stable: post-procedure vital signs reviewed and stable  Report to RN Given: handoff report given  Patient transferred to: PACU    Handoff Report: Identifed the Patient, Identified the Reponsible Provider, Reviewed the pertinent medical history, Discussed the surgical course, Reviewed Intra-OP anesthesia mangement and issues during anesthesia, Set expectations for post-procedure period and Allowed opportunity for questions and acknowledgement of understanding      Vitals:  Vitals Value Taken Time   /61 03/28/23 1101   Temp     Pulse 56 03/28/23 1104   Resp 14 03/28/23 1104   SpO2 100 % 03/28/23 1104   Vitals shown include unvalidated device data.    Electronically Signed By: SHELDON Soria CRNA  March 28, 2023  11:05 AM

## 2023-03-28 NOTE — ANESTHESIA POSTPROCEDURE EVALUATION
Patient: Mahnza Stallings    Procedure: Procedure(s):  LUMBAR 4 TO 5 POSTERIOR LUMBAR INSTRUMENTED FUSION WITHOUT INTERBODY CAGE AND WITH LAMINECTOMIES       Anesthesia Type:  General    Note:     Postop Pain Control: Uneventful            Sign Out: Well controlled pain   PONV: No   Neuro/Psych: Uneventful            Sign Out: Acceptable/Baseline neuro status   Airway/Respiratory: Uneventful            Sign Out: Acceptable/Baseline resp. status   CV/Hemodynamics: Uneventful            Sign Out: Acceptable CV status   Other NRE: NONE   DID A NON-ROUTINE EVENT OCCUR? No           Last vitals:  Vitals Value Taken Time   BP 96/62 03/28/23 1132   Temp     Pulse 68 03/28/23 1134   Resp 17 03/28/23 1134   SpO2 90 % 03/28/23 1134   Vitals shown include unvalidated device data.    Electronically Signed By: Eloy Hardin MD  March 28, 2023  11:35 AM

## 2023-03-28 NOTE — ANESTHESIA PROCEDURE NOTES
Airway       Patient location during procedure: OR       Procedure Start/Stop Times: 3/28/2023 8:22 AM  Staff -        CRNA: Yolande Vieira APRN CRNA       Performed By: CRNA  Consent for Airway        Urgency: elective  Indications and Patient Condition       Indications for airway management: kera-procedural       Induction type:intravenous       Mask difficulty assessment: 1 - vent by mask    Final Airway Details       Final airway type: endotracheal airway       Successful airway: ETT - single and Oral  Endotracheal Airway Details        ETT size (mm): 8.0       Cuffed: yes       Successful intubation technique: video laryngoscopy (minimal neck extension)       VL Blade Size: Glidescope 3       Grade View of Cords: 1       Adjucts: stylet       Position: Center       Measured from: gums/teeth       Secured at (cm): 22       Bite block used: Soft    Post intubation assessment        Placement verified by: capnometry, equal breath sounds and chest rise        Number of attempts at approach: 1       Number of other approaches attempted: 0       Secured with: plastic tape       Ease of procedure: easy       Dentition: Intact (prior missing)    Medication(s) Administered   Medication Administration Time: 3/28/2023 8:22 AM

## 2023-03-28 NOTE — OP NOTE
Procedure Date: 03/28/2023    PREOPERATIVE DIAGNOSES:    1.  L4 to L5 spinal stenosis.  2.  L4 to L5 degenerative spondylolisthesis.  3.  Chronic left L5 radiculopathy.    POSTOPERATIVE DIAGNOSES:    1.  L4 to L5 spinal stenosis.  2.  L4 to L5 degenerative spondylolisthesis.  3.  Chronic left L5 radiculopathy.  4.  Very tall L4 to L5 well-preserved disk space.    PROCEDURES:    1.  L4 to L5 posterior lumbar fusion.  2.  Posterior instrumentation using a bilateral pedicle screw and ariel construct spanning from L4 to L5.  3.  Central laminectomy L4, decompressing the L4 and L5 nerve roots.  4.  Central laminectomy L5, decompression of bilateral L5 nerve roots.  5.  Local autograft bone.  6.  Allograft bone graft extender using a 1 x 10 MagniFuse.  7.  Fluoroscopy.  8.  DBVutronic O-arm.    SURGEON:  Rao Atkins M.D.     ASSISTANT:  Albin Madden PA-C.    ANESTHESIA:  General endotracheal anesthesia without complication.    COMPLICATIONS:  None.    DRAINS:  One Hemovac drain placed prior to closure, taken out a separate poke hole and tied to the skin with a nylon stitch to prevent backout.    ESTIMATED BLOOD LOSS:  50 mL    FINDINGS:  Full decompression with solid instrumentation L4 to L5.  Extremely tall disk space, and therefore, we chose not to perform an interbody fusion as it was not necessary.  He really did not have significant foraminal stenosis and did not require elevation of the disk height.    INDICATIONS FOR PROCEDURE:  Mahnaz Stallings is a 72-year-old male who had [facility] with symptoms of chronic left leg radiculopathy in an L5 type distribution.  His imaging demonstrated well-preserved disk height through the lumbar spine; however, he was noted to have L4 to L5 spinal stenosis with lateral recess narrowing and impingement of the L5 nerve roots.  He had significant hypertrophic facet joints and ligamentum flavum hypertrophy.  He had failed conservative care including watchful  waiting, medications, therapies, injection treatments as well.  Based on lack of improvement with conservative care, we discussed the above-mentioned procedure as a possible means to improve or alleviate his leg pain.  We discussed risks, benefits and alternatives and ultimately he elected to proceed.  He was presented with a consent form, which was read, understood and signed.  All questions were answered.  He had been referred for preoperative H and P.    DESCRIPTION OF PROCEDURE:  On the date of the procedure, he was seen in the preoperative area.  His wife and an  were present.  All questions were addressed.  Consent was signed by both parties.  After finding no contraindications, he was brought back to the operating room, where he was successfully sedated and an ET tube was placed.  Morales catheter was placed under sterile conditions.  He was ultimately repositioned prone over a 4-post frame on a Luis Enrique table.  Eyes were free of compression.  SCDs were in place.  Shoulders and elbows were at 90 degrees with the shoulders slightly flexed forward.  The lumbar region was prepped and draped in standard fashion.  Timeout was performed and IV antibiotics were administered.  We localized in the midline over L4 to L5.  This was then marked and infiltrated with Marcaine mixed with epinephrine. A 10 blade was used to create a midline incision over L4 to L5.  We did continue our dissection down to the lumbar fascia, which was split in the midline.  I performed a subperiosteal dissection L3 to L5.  A Kocher clamp had been applied.  We verified our location and continued.  We reentered the wound and continued our exposure, which included exposure of the bilateral L4 and L5 transverse processes deep into the gutters.  Cottonoids were placed in the gutters.  Gelpi retractors were used for deep retraction.  With our exposure completed, we then placed a spinous process clamp on L3.  The Spoondate O-arm was brought  into the room.  A spin was completed and the information was successfully transferred to the LinkStorm system.    We reentered the wound at that point.  We then verified our landmarks.  We then moved on to placement of our  holes for our pedicle screws with a Stealth-guided high-speed bur.  These were then upsized with a 4.5 mm tap, noting all proper lengths of 40 mm.  Prior to placing our four pedicle screws, I decorticated the bilateral gutters in efforts to perform our bony fusion from L4 to L5.  The bilateral gutters including the transverse processes, lateral facet joint and pars interarticularis were decorticated.  We then applied a 1 x 10 MagniFuse to each gutter as a bone graft extender for posterior fusion purposes.  Later in the procedure, we also came back to backfill the remaining gutters with local autograft bone that had been collected and harvested during our laminectomy procedure.    Following the completion of our posterior fusion, I then moved on to placement of our instrumentation.  Using a PowerEase, we placed our four standard Medtronic Solera pedicle screws.  Each screw was 6.5 x 40 mm in length.  Four screws were placed without complications and had solid endpoints.    I then moved on to our decompression process.  The spinous processes of L4 and L5 were taken down with a Leksell rongeur.  Five was partly taken down while L4 was completely taken down.  The lamina was thinned.  I then switched over to a high-speed bur to perform a wide central decompression at L4 and L5.  During this process, we did collect the bone shavings with a bone dust trap on the filter line.  As mentioned, the laminectomy was taken out widely through L4 and L5 to fully decompress the bilateral L4 and L5 nerve roots.  Subtotal facetectomies were performed bilaterally for this.  Foraminotomies were completed.  Following decompression, I was able to palpate circumferentially through decompressed surgical bed, and there  was no evidence of ongoing stenosis in the lateral recess or foramen of L4 or L5 nerve roots.  I was able to completely visualize the L5 nerve as it is wrapped around the L5 pedicle completely free of any impingement.  This completed our decompression.    Lastly, we incorporated our collected autograft into the bilateral gutters for fusion purposes as mentioned above.  This was followed by completion of our instrumentation by placing two standard 5.5 mm diameter titanium rods, which were 35 mm in length, spanning from L4 to L5.  Four end caps were applied and final tightened.  X-rays were obtained in both AP and lateral projection with excellent placement of our instrumentation.  The wound was then copiously irrigated and suctioned.  One last inspection revealed no debris within the surgical bed.  All counts were correct.  A drain was placed deep to the lumbar fascia, taken out a separate poke hole and tied to the skin with nylon stitch to prevent backout.  The wound was then closed in layers including the lumbar fascia, subcutaneous tissue and skin.  The wound was cleaned and dried.  Dermabond was applied.  Sterile dressings were applied.  Drapes were taken down.  He was rolled back into supine position, extubated, and brought to the PACU in stable condition.    Albin Madden PA-C, was present from start to finish and participated in all aspects of the case including positioning, exposure, retraction, suctioning, handling instrumentation and closure.  We used Medtronic pedicle screws and bone graft extenders today.  We did not place an interbody cage due to the very tall disk space at L4 to L5 in the absence of significant foraminal stenosis.  Counts were correct prior to closure.  No complications.    Rao Atkins MD        D: 2023   T: 2023   MT: MIRIAM    Name:     KONSTANTIN ANDRADE  MRN:      [id number]        Account:        [id number]   :      1950           Procedure Date: 2023      Document: [id number]

## 2023-03-29 ENCOUNTER — APPOINTMENT (OUTPATIENT)
Dept: PHYSICAL THERAPY | Facility: CLINIC | Age: 73
DRG: 460 | End: 2023-03-29
Attending: ORTHOPAEDIC SURGERY
Payer: COMMERCIAL

## 2023-03-29 ENCOUNTER — APPOINTMENT (OUTPATIENT)
Dept: OCCUPATIONAL THERAPY | Facility: CLINIC | Age: 73
DRG: 460 | End: 2023-03-29
Attending: ORTHOPAEDIC SURGERY
Payer: COMMERCIAL

## 2023-03-29 LAB — HGB BLD-MCNC: 13.7 G/DL (ref 13.3–17.7)

## 2023-03-29 PROCEDURE — 99223 1ST HOSP IP/OBS HIGH 75: CPT | Performed by: PHYSICIAN ASSISTANT

## 2023-03-29 PROCEDURE — 250N000013 HC RX MED GY IP 250 OP 250 PS 637: Performed by: PHYSICIAN ASSISTANT

## 2023-03-29 PROCEDURE — 250N000011 HC RX IP 250 OP 636: Performed by: PHYSICIAN ASSISTANT

## 2023-03-29 PROCEDURE — 36415 COLL VENOUS BLD VENIPUNCTURE: CPT | Performed by: PHYSICIAN ASSISTANT

## 2023-03-29 PROCEDURE — 85018 HEMOGLOBIN: CPT | Performed by: PHYSICIAN ASSISTANT

## 2023-03-29 PROCEDURE — 120N000001 HC R&B MED SURG/OB

## 2023-03-29 PROCEDURE — 97530 THERAPEUTIC ACTIVITIES: CPT | Mod: GP | Performed by: PHYSICAL THERAPIST

## 2023-03-29 PROCEDURE — 97161 PT EVAL LOW COMPLEX 20 MIN: CPT | Mod: GP | Performed by: PHYSICAL THERAPIST

## 2023-03-29 PROCEDURE — 97535 SELF CARE MNGMENT TRAINING: CPT | Mod: GO

## 2023-03-29 PROCEDURE — 97116 GAIT TRAINING THERAPY: CPT | Mod: GP | Performed by: PHYSICAL THERAPIST

## 2023-03-29 PROCEDURE — 97165 OT EVAL LOW COMPLEX 30 MIN: CPT | Mod: GO

## 2023-03-29 RX ADMIN — METHOCARBAMOL 500 MG: 500 TABLET ORAL at 20:59

## 2023-03-29 RX ADMIN — ACETAMINOPHEN 975 MG: 325 TABLET, FILM COATED ORAL at 01:47

## 2023-03-29 RX ADMIN — Medication 1 TABLET: at 17:11

## 2023-03-29 RX ADMIN — SENNOSIDES AND DOCUSATE SODIUM 1 TABLET: 8.6; 5 TABLET ORAL at 20:50

## 2023-03-29 RX ADMIN — METHOCARBAMOL 500 MG: 500 TABLET ORAL at 09:04

## 2023-03-29 RX ADMIN — CEFAZOLIN SODIUM 2 G: 2 INJECTION, SOLUTION INTRAVENOUS at 00:43

## 2023-03-29 RX ADMIN — Medication 25 MCG: at 09:04

## 2023-03-29 RX ADMIN — Medication 1 TABLET: at 11:13

## 2023-03-29 RX ADMIN — OXYCODONE HYDROCHLORIDE 5 MG: 5 TABLET ORAL at 17:10

## 2023-03-29 RX ADMIN — Medication 25 MCG: at 20:50

## 2023-03-29 RX ADMIN — OXYCODONE HYDROCHLORIDE 5 MG: 5 TABLET ORAL at 09:03

## 2023-03-29 RX ADMIN — ACETAMINOPHEN 975 MG: 325 TABLET, FILM COATED ORAL at 18:35

## 2023-03-29 RX ADMIN — OXYCODONE HYDROCHLORIDE 5 MG: 5 TABLET ORAL at 01:47

## 2023-03-29 RX ADMIN — HYDROXYZINE HYDROCHLORIDE 10 MG: 10 TABLET ORAL at 01:47

## 2023-03-29 RX ADMIN — Medication 1 TABLET: at 09:04

## 2023-03-29 RX ADMIN — SENNOSIDES AND DOCUSATE SODIUM 1 TABLET: 8.6; 5 TABLET ORAL at 09:03

## 2023-03-29 RX ADMIN — ACETAMINOPHEN 975 MG: 325 TABLET, FILM COATED ORAL at 11:13

## 2023-03-29 RX ADMIN — FINASTERIDE 5 MG: 5 TABLET, FILM COATED ORAL at 09:04

## 2023-03-29 RX ADMIN — DOXAZOSIN 2 MG: 1 TABLET ORAL at 09:03

## 2023-03-29 ASSESSMENT — ACTIVITIES OF DAILY LIVING (ADL)
ADLS_ACUITY_SCORE: 20

## 2023-03-29 NOTE — PROGRESS NOTES
03/29/23 1100   Appointment Info   Signing Clinician's Name / Credentials (OT) Faith Sutton, OTR/L   Rehab Comments (OT) per nursing, clarified with MD and pt does not need brace when OOB; see note from nursing 3/29/23. spinal precautions       Present yes   Language Cantonese   Living Environment   People in Home spouse;child(ralph), adult   Current Living Arrangements house   Home Accessibility stairs within home   Number of Stairs, Within Home, Primary greater than 10 stairs   Transportation Anticipated family or friend will provide;car, drives self   Living Environment Comments Pt reports he has tubshower with shower chair with grab bars. SH toilet with vanity next to.   Self-Care   Usual Activity Tolerance moderate   Current Activity Tolerance fair   Fall history within last six months yes   Number of times patient has fallen within last six months 1   Activity/Exercise/Self-Care Comment Pt reports at baseline he does have assist with toileting and showering. He also reports family assists with meals, meds and household chores. He does state he drives short distances but family can provide rides too.   General Information   Onset of Illness/Injury or Date of Surgery 03/28/23   Referring Physician Albin Madden PA-C   Patient/Family Therapy Goal Statement (OT) return home with family assist   Additional Occupational Profile Info/Pertinent History of Current Problem lumbar fusion post op day 1   General Observations and Info pt has very supportive family   Cognitive Status Examination   Cognitive Status Comments no cog impairment noted   Pain Assessment   Patient Currently in Pain Yes, see Vital Sign flowsheet  (pt notes severe pain with mobility in room)   Bed Mobility   Bed Mobility sit-supine   Sit-Supine [place] (Bed Mobility) supervision;verbal cues   Transfers   Transfers sit-stand transfer;toilet transfer   Sit-Stand Transfer   Sit-Stand [place] (Transfers) contact  guard   Toilet Transfer   [place] Level (Toilet Transfer) contact guard   Balance   Balance Comments no LOB noted during session   Activities of Daily Living   BADL Assessment/Intervention upper body dressing   Upper Body Dressing Assessment/Training   [place] Level (Upper Body Dressing) maximum assist (25% patient effort)   Clinical Impression   Criteria for Skilled Therapeutic Interventions Met (OT) Yes, treatment indicated   OT Diagnosis lumbar fusion   OT Problem List-Impairments impacting ADL activity tolerance impaired;flexibility;mobility;pain;post-surgical precautions   Assessment of Occupational Performance 5 or more Performance Deficits   Identified Performance Deficits dressing, toileting, showering, functional transfers, and IADLs   Planned Therapy Interventions (OT) ADL retraining;IADL retraining;transfer training;progressive activity/exercise   Clinical Decision Making Complexity (OT) low complexity   Anticipated Equipment Needs Upon Discharge (OT) hip kit;tub bench   Risk & Benefits of therapy have been explained evaluation/treatment results reviewed;care plan/treatment goals reviewed;risks/benefits reviewed;current/potential barriers reviewed;participants voiced agreement with care plan;participants included;patient   Clinical Impression Comments Pt has very supportive family   OT Total Evaluation Time   OT Eval, Low Complexity Minutes (13352) 11   OT Goals   Therapy Frequency (OT) Daily   OT Predicted Duration/Target Date for Goal Attainment 03/31/23   OT Goals Hygiene/Grooming;Upper Body Dressing;OT Goal 1;OT Goal 2   OT: Hygiene/Grooming supervision/stand-by assist   OT: Upper Body Dressing Supervision/stand-by assist   OT: Goal 1 Pt will be SBA with tubshower transfer.   OT: Goal 2 Pt will be able to verbalize 3/3 spinal precautions.   Interventions   Interventions Quick Adds Self-Care/Home Management   Self-Care/Home Management   Self-Care/Home Mgmt/ADL, Compensatory, Meal Prep Minutes  (54350) 24   Symptoms Noted During/After Treatment (Meal Preparation/Planning Training) increased pain   Treatment Detail/Skilled Intervention Pt is sitting in chair upon arrival. Interperter used via IPAD for [place]e. Pt is educated on spinal precautions. Pt verbalizes understanding. Pt is max A with donning brace and is educated how to adjust. Pt verbalizes understanding. Pt is not able to cross legs for LB dressing. Pt is not interested in going over AE for LB dressing as he reports family will assist. Pt is CGA with STS with 2ww. Pt is CGA with ambulating in room with 2ww to bathroom. Pt is CCA with toilet transfer. Pt is SBA with bed mobility from sit to supine with cues to comply to precautions. Pt is left in bed with call light in reach and bed alarm on. Pt is wanting OT to come back tomorrow; OT suggests having family present so they can go over precautions. Pt reports wife will be here in AM.   OT Discharge Planning   OT Plan tubshower transfer; donning brace; teachback of spinal precautions   OT Discharge Recommendation (DC Rec) home with assist   OT Rationale for DC Rec Anticipate pt will be able to d/c home with family and use of walker, hip kit, and tubshower bench.   OT Brief overview of current status CGA toilet transfer; CGA ambulating in room with 2ww; SBA bed mobility   Total Session Time   Timed Code Treatment Minutes 24   Total Session Time (sum of timed and untimed services) 35

## 2023-03-29 NOTE — PROGRESS NOTES
Care Management Follow Up    Length of Stay (days): 1    Expected Discharge Date: 03/30/2023     Concerns to be Addressed:       Patient plan of care discussed at interdisciplinary rounds: Yes      SW sent referrals to [facility]. JHONY will follow up tomorrow with pt.    Kellie Álvarez, MSW, LICSW, [facility]  Inpatient Care Coordination  [facility]  [phone]

## 2023-03-29 NOTE — CONSULTS
"[facility]  Consult Note - Hospitalist Service  Date of Admission:  3/28/2023  Consult Requested by: Albin Madden PA-C  Reason for Consult: Post operative medical management    Assessment & Plan   Mahnaz Stallings is a 72 year old male with PMH significant for hypertension, tobacco abuse, prediabetes, chronic bilateral low back pain, and nocturia who was admitted on 3/28/2023. He s/p L4-L5 posterior lumbar fusion with laminectomies.    #Lumbar spinal stenosis  #S/p L4-L5 posterior lumbar fusion with laminectomies  -pain control, DVT prophylaxis, therapy, and discharge planning per primary team     #HTN: BP stable   -continue PTA Amlodipine, Atenolol, and Doxazosin with parameters    #Prediabetes: most recent HgbA1c of 6.4 in 01/2023.   -no need to follow BG levels while admitted    #Nocturia: resume PTA finasteride      The patient's care was discussed with the Attending Physician, Dr. Salazar, Bedside Nurse, Patient and Patient's Family.    Clinically Significant Risk Factors                        # Overweight: Estimated body mass index is 29.16 kg/m  as calculated from the following:    Height as of this encounter: 1.715 m (5' 7.5\").    Weight as of this encounter: 85.7 kg (189 lb)., PRESENT ON ADMISSION       Helena Guerra PA-C  Hospitalist Service  Securely message with [facility] (more info)  Text page via [facility] Paging/Directory   ______________________________________________________________________    Chief Complaint   S/p L4-L5 posterior lumbar fusion with laminectomies    History is obtained from the patient and family with use of Cantonese interpretor via ipad and chart review.    History of Present Illness   Mahnaz Stallings is a 72 year old male who s/p L4-L5 posterior lumbar fusion with laminectomies. No intraoperative complications. Since surgery patient reports pain is minimal at rest and rates it as a 3/10.  He reports increased pain when attempting to turn or sit up.  Denies associated " numbness or tingling into his lower extremities. He reports he had an episode of vomiting after surgery but none since then and is currently not nauseated.  He is tolerating adequate oral intake.  Denies chest pain, shortness of breath, or abdominal pain.  The patient had his pepper catheter removed this morning and has been able to void once since then.  He has passed flatus this morning.    VSS. I&Os reviewed and patient currently net -1.6 L with goo urine output since surgery. Hgb stable at 13.7.      Past Medical History    Past Medical History:   Diagnosis Date     Hypertension      Pre-diabetes     HGB A1c was 6.4 in 1/2023     Tubular adenoma of colon      Past Surgical History   Past Surgical History:   Procedure Laterality Date     ORTHOPEDIC SURGERY Left     Left wrist fracture repaired     Medications   I have reviewed this patient's current medications       Review of Systems    The 10 point Review of Systems is negative other than noted in the HPI.    Family History     Family history reviewed with patient and significant for CVA in mother and father.     Physical Exam   Vital Signs: Temp: 98.3  F (36.8  C) Temp src: Temporal BP: 110/61 Pulse: 66   Resp: 18 SpO2: 93 % O2 Device: Nasal cannula Oxygen Delivery: 2 LPM  Weight: 189 lbs 0 oz  General Appearance: Pleasant, interactive, A&Ox3, NAD  Respiratory: CTAB, no wheezes or rales  Cardiovascular: RRR, no murmur or rub  GI: soft, nontender, nondistended, normoactive bowel sounds  Skin: warm, dry, no lesions or rashes in visualized areas  Back: Incision site c/d/i with drain in place  Neuro: sensation to touch grossly intact, symmetric movement of all extremities    Medical Decision Making       60 MINUTES SPENT BY ME on the date of service doing chart review, history, exam, documentation & further activities per the note.      Data   ------------------------- PAST 24 HR DATA REVIEWED -----------------------------------------------

## 2023-03-29 NOTE — PLAN OF CARE
Goal Outcome Evaluation:    Patient vital signs are at baseline: Yes  Patient able to ambulate as they were prior to admission or with assist devices provided by therapies during their stay:  No,  Reason:  Has not been OOB this evening.  Patient MUST void prior to discharge:  No,  Reason:  Morales catheter in place  Patient able to tolerate oral intake:  Yes; emesis episode when transported to floor. Declined zofran.  Pain has adequate pain control using Oral analgesics:  Yes  Does patient have an identified :  Yes  Has goal D/C date and time been discussed with patient:  No,  Reason:  Pending PT/OT    Alert and oriented x4. Cantonese speaking. Vital signs are stable, pt is on 1L o2 via nasal cannula. Denied pain. Denied numbness and tingling. Dressing to back is clean, dry, and intact. Hemovac in place.

## 2023-03-29 NOTE — PROGRESS NOTES
03/29/23 1030   Appointment Info   Signing Clinician's Name / Credentials (PT) Vicki Bettencourt DPT       Present yes   Language Cantonese   Living Environment   People in Home spouse;child(ralph), adult   Current Living Arrangements house   Home Accessibility stairs to enter home;stairs within home   Number of Stairs, Main Entrance 2   Stair Railings, Main Entrance none   Number of Stairs, Within Home, Primary greater than 10 stairs   Stair Railings, Within Home, Primary railings safe and in good condition   Transportation Anticipated family or friend will provide   Self-Care   Equipment Currently Used at Home cane, straight   Fall history within last six months yes   Number of times patient has fallen within last six months 1  (Despite questions was not specific re: falls)   Activity/Exercise/Self-Care Comment Pt inde with SEC with ambulation prior to admit   General Information   Onset of Illness/Injury or Date of Surgery 03/28/23   Referring Physician Rao Atkins MD   Patient/Family Therapy Goals Statement (PT) Pt hoping to DC home tomorrow   Pertinent History of Current Problem (include personal factors and/or comorbidities that impact the POC) L4 to L5 posterior lumbar fusion.   Existing Precautions/Restrictions spinal  (no brace)   Cognition   Affect/Mental Status (Cognition) WNL   Orientation Status (Cognition) oriented x 3   Follows Commands (Cognition) follows two-step commands   Pain Assessment   Patient Currently in Pain Yes, see Vital Sign flowsheet   Posture    Posture Forward head position;Protracted shoulders   Range of Motion (ROM)   ROM Comment limited due to surgical proc   Strength (Manual Muscle Testing)   Strength Comments limited L quad strength, able to SLR, but noted with ambulation   Bed Mobility   Comment, (Bed Mobility) min A   Transfers   Comment, (Transfers) min A   Gait/Stairs (Locomotion)   Comment, (Gait/Stairs) noted poor full knee extension on the L LE with  pregait skills, FWW   Clinical Impression   Criteria for Skilled Therapeutic Intervention Yes, treatment indicated   PT Diagnosis (PT) decreased functional mobility   Influenced by the following impairments decreased strength, pain, decreased ROM, spinal prec   Functional limitations due to impairments decreased bed mob, transfers, ambulation, stairs   Clinical Presentation (PT Evaluation Complexity) Stable/Uncomplicated   Clinical Presentation Rationale improving status post lumbar fusion   Clinical Decision Making (Complexity) low complexity   Planned Therapy Interventions (PT) balance training;bed mobility training;gait training;patient/family education;strengthening;transfer training;progressive activity/exercise;risk factor education;home program guidelines   Anticipated Equipment Needs at Discharge (PT) walker, rolling   Risk & Benefits of therapy have been explained evaluation/treatment results reviewed;care plan/treatment goals reviewed;risks/benefits reviewed;current/potential barriers reviewed;participants voiced agreement with care plan;patient;participants included   PT Total Evaluation Time   PT Eval, Low Complexity Minutes (59119) 5   Physical Therapy Goals   PT Frequency 2x/day   PT Predicted Duration/Target Date for Goal Attainment 03/30/23   PT Goals Bed Mobility;Transfers;Gait;Stairs   Interventions   Interventions Quick Adds Therapeutic Activity;Gait Training   Therapeutic Activity   Therapeutic Activities: dynamic activities to improve functional performance Minutes (34627) 15   Symptoms Noted During/After Treatment Increased pain   Treatment Detail/Skilled Intervention Pt was educated on spinal prec with visual verbal cues. Pt was cued for supine to sit with log rolling. Pt needing min A. Pt was cued for sit<>stand with FWW, needing min A for spinal prec following.   Gait Training   Gait Training Minutes (11203) 10   Symptoms Noted During/After Treatment (Gait Training) increased pain    Treatment Detail/Skilled Intervention Pt was cued for ambulation with FWW, cues for improving quad contraction, needing manual and verbal cues for proper quad set. Pt was cued for proper use and proximity to walker, limited due to back pain 60 feet.   PT Discharge Planning   PT Plan Progress ambulation, improve bed mob, transfers, stairs when appropriate   PT Discharge Recommendation (DC Rec) home with assist;home with home care physical therapy   PT Rationale for DC Rec Pt likely to meet basic goals with help with stairs mabel 2 without rail with continued PT. Pt would benefit from home PT to progress safety with ambulation and wean from FWW. Currently will need one for safety at [place].   PT Brief overview of current status Pt was able to perform 60 feet of ambulation   Total Session Time   Timed Code Treatment Minutes 25   Total Session Time (sum of timed and untimed services) 30

## 2023-03-29 NOTE — PROGRESS NOTES
"Ortho Rounding Note    S: In bed, yet to work with therapy. Pain controlled although increased axial incisional LBP with movement. LE radiculopathy improved. Denies SOB although on 1L O2 via NC, CP. No additional ortho concerns.     O:  Vital signs:   Blood pressure 113/69, pulse 83, temperature 98.3  F (36.8  C), temperature source Temporal, resp. rate 18, height 1.715 m (5' 7.5\"), weight 85.7 kg (189 lb), SpO2 92 %.  Estimated body mass index is 29.16 kg/m  as calculated from the following:    Height as of this encounter: 1.715 m (5' 7.5\").    Weight as of this encounter: 85.7 kg (189 lb).      Intake/Output Summary (Last 24 hours) at 3/29/2023 1433  Last data filed at 3/29/2023 1327  Gross per 24 hour   Intake 360 ml   Output 4590 ml   Net -4230 ml         Drain intact - to gravity   Dressings c/d/i  5/5 motor and SPLT in BL UE and LE    A:  POD #1 s/p L4-5 PSF with central decompression     P:  General: Overall doing okay. Yet to be up and out of bed. Okay for patient to not have brace, Continue with planned PT/OT and daily cares.   Pain: PO  Act: up ad pina, with therapy  DVT: Mech only  ID: routine postop abx to be completed 24 hours after surgery  Dispo: Plan to discharge to home tomorrow if he passes PT, medically stable, tolerating food, urinating  Appreciate Medicine consult for medical management    Albin Madden PA-C    "

## 2023-03-29 NOTE — PROGRESS NOTES
Patient vital signs are at baseline: Yes  Patient able to ambulate as they were prior to admission or with assist devices provided by therapies during their stay:  No,  Reason:  Pt hasn't been able to ambulate during overnight shift.No brace available.   Patient MUST void prior to discharge:  No,  Reason:  Morales catheter removed due to void.   Patient able to tolerate oral intake:  Yes  Pain has adequate pain control using Oral analgesics:  Yes  Does patient have an identified :  Yes  Has goal D/C date and time been discussed with patient:  No     A/O x4 [place] speaking. VS stable and pt on . Pain managed with Oxy, Atarax and tyln. Hemovac output 90 ml.

## 2023-03-29 NOTE — PLAN OF CARE
Goal Outcome Evaluation:       Patient vital signs are at baseline: No, requiring 1.0L O2. BP meds held this am.  Patient able to ambulate as they were prior to admission or with assist devices provided by therapies during their stay:  Yes  Patient MUST void prior to discharge:  Yes  Patient able to tolerate oral intake:  Yes  Pain has adequate pain control using Oral analgesics:  Yes  Does patient have an identified :  Yes  Has goal D/C date and time been discussed with patient:  Yes, plan is to discharge home tomorrow    A&O x4, Cantoneese speaking, Vocera or iPad for interpretive services. Son is able to interpret at bedside if present. Assist x1 with GB and walker. Continues to require 1.0L O2. Encouraged use of IS. Voiding, pain managed with scheduled Tylenol. PRN oxy before PT/OT today. [place] 4-5/10 when moving. Little to no pain if not moving. Tolerating regular diet. Pt declined ice. Plan is to discharge tomorrow.

## 2023-03-29 NOTE — PROVIDER NOTIFICATION
Spoke with PARamírez, by phone. Okay to ambulate without brace. Go ahead with PT/OT. Switch hemovac drain to gravity only.  Possible discharge later today.

## 2023-03-30 ENCOUNTER — APPOINTMENT (OUTPATIENT)
Dept: PHYSICAL THERAPY | Facility: CLINIC | Age: 73
DRG: 460 | End: 2023-03-30
Attending: ORTHOPAEDIC SURGERY
Payer: COMMERCIAL

## 2023-03-30 ENCOUNTER — APPOINTMENT (OUTPATIENT)
Dept: OCCUPATIONAL THERAPY | Facility: CLINIC | Age: 73
DRG: 460 | End: 2023-03-30
Attending: ORTHOPAEDIC SURGERY
Payer: COMMERCIAL

## 2023-03-30 PROCEDURE — 97535 SELF CARE MNGMENT TRAINING: CPT | Mod: GO

## 2023-03-30 PROCEDURE — 99232 SBSQ HOSP IP/OBS MODERATE 35: CPT | Performed by: INTERNAL MEDICINE

## 2023-03-30 PROCEDURE — 97116 GAIT TRAINING THERAPY: CPT | Mod: GP | Performed by: PHYSICAL THERAPIST

## 2023-03-30 PROCEDURE — 250N000013 HC RX MED GY IP 250 OP 250 PS 637: Performed by: PHYSICIAN ASSISTANT

## 2023-03-30 PROCEDURE — 120N000001 HC R&B MED SURG/OB

## 2023-03-30 PROCEDURE — 97530 THERAPEUTIC ACTIVITIES: CPT | Mod: GP | Performed by: PHYSICAL THERAPIST

## 2023-03-30 RX ORDER — VITAMIN B COMPLEX
25 TABLET ORAL 2 TIMES DAILY
Qty: 180 TABLET | Refills: 0 | Status: SHIPPED | OUTPATIENT
Start: 2023-03-30

## 2023-03-30 RX ORDER — METHOCARBAMOL 500 MG/1
500 TABLET, FILM COATED ORAL EVERY 6 HOURS PRN
Qty: 30 TABLET | Refills: 0 | Status: SHIPPED | OUTPATIENT
Start: 2023-03-30

## 2023-03-30 RX ORDER — ACETAMINOPHEN 325 MG/1
650 TABLET ORAL EVERY 4 HOURS PRN
Qty: 100 TABLET | Refills: 0 | Status: SHIPPED | OUTPATIENT
Start: 2023-03-30

## 2023-03-30 RX ORDER — OXYCODONE HYDROCHLORIDE 5 MG/1
5-10 TABLET ORAL EVERY 6 HOURS PRN
Qty: 26 TABLET | Refills: 0 | Status: SHIPPED | OUTPATIENT
Start: 2023-03-30

## 2023-03-30 RX ORDER — AMOXICILLIN 250 MG
1-2 CAPSULE ORAL 2 TIMES DAILY
Qty: 30 TABLET | Refills: 0 | Status: SHIPPED | OUTPATIENT
Start: 2023-03-30

## 2023-03-30 RX ADMIN — METHOCARBAMOL 500 MG: 500 TABLET ORAL at 15:10

## 2023-03-30 RX ADMIN — Medication 1 TABLET: at 17:10

## 2023-03-30 RX ADMIN — Medication 1 TABLET: at 08:07

## 2023-03-30 RX ADMIN — BISACODYL 10 MG: 10 SUPPOSITORY RECTAL at 17:40

## 2023-03-30 RX ADMIN — HYDROXYZINE HYDROCHLORIDE 10 MG: 10 TABLET ORAL at 08:14

## 2023-03-30 RX ADMIN — AMLODIPINE BESYLATE 2.5 MG: 2.5 TABLET ORAL at 08:07

## 2023-03-30 RX ADMIN — OXYCODONE HYDROCHLORIDE 10 MG: 5 TABLET ORAL at 17:09

## 2023-03-30 RX ADMIN — Medication 25 MCG: at 20:16

## 2023-03-30 RX ADMIN — ACETAMINOPHEN 975 MG: 325 TABLET, FILM COATED ORAL at 02:09

## 2023-03-30 RX ADMIN — DOXAZOSIN 2 MG: 1 TABLET ORAL at 08:07

## 2023-03-30 RX ADMIN — POLYETHYLENE GLYCOL 3350 17 G: 17 POWDER, FOR SOLUTION ORAL at 08:07

## 2023-03-30 RX ADMIN — Medication 25 MCG: at 08:07

## 2023-03-30 RX ADMIN — ATENOLOL 25 MG: 25 TABLET ORAL at 08:07

## 2023-03-30 RX ADMIN — ACETAMINOPHEN 975 MG: 325 TABLET, FILM COATED ORAL at 17:35

## 2023-03-30 RX ADMIN — Medication 1 TABLET: at 12:27

## 2023-03-30 RX ADMIN — SENNOSIDES AND DOCUSATE SODIUM 1 TABLET: 8.6; 5 TABLET ORAL at 20:16

## 2023-03-30 RX ADMIN — FINASTERIDE 5 MG: 5 TABLET, FILM COATED ORAL at 08:07

## 2023-03-30 RX ADMIN — ACETAMINOPHEN 975 MG: 325 TABLET, FILM COATED ORAL at 10:35

## 2023-03-30 RX ADMIN — SENNOSIDES AND DOCUSATE SODIUM 1 TABLET: 8.6; 5 TABLET ORAL at 08:07

## 2023-03-30 ASSESSMENT — ACTIVITIES OF DAILY LIVING (ADL)
ADLS_ACUITY_SCORE: 20

## 2023-03-30 NOTE — PROGRESS NOTES
Hospitalist Medicine Progress Note   [facility]       Mahnaz Stallings is a 72 year old male with PMH significant for hypertension, tobacco abuse, prediabetes, chronic bilateral low back pain, and nocturia who was admitted to [facility] on 3/28/2023 with L4 to L5 spinal stenosis, L4 to L5 degenerative spondylolisthesis and Chronic left L5 radiculopathy for which he had s/p L4-L5 posterior lumbar fusion with laminectomies by Dr. Rao Atkins M.D 3/28/2023            Date of Admission:  3/28/2023  Assessment & Plan     #Lumbar spinal stenosis  #S/p L4-L5 posterior lumbar fusion with laminectomies  -pain control, DVT prophylaxis, therapy, and discharge planning per primary team      HTN:   BP stable   -continue PTA Amlodipine, Atenolol, and Doxazosin with parameters     Prediabetes:   most recent HgbA1c of 6.4 in 01/2023.   -no need to follow BG levels while admitted     Nocturia probably from BPH:   resume PTA finasteride             Plan:   Monitor for urinary obstruction closely  From internal medicine perspective patient could go home when okay with primary team    Diet: Advance Diet as Tolerated: Regular Diet Adult    Morales Catheter: Not present  Code Status: Full Code               The patient's care was discussed with the Patient     Francisco Salazar MD  Hospitalist Service  [facility]    ______________________________________________________________________    Interval History     Symptoms   Patient has had difficulty voiding urine at times and had to be straight catheterized    Review of Systems:   Pain in the back is 6/10 according to him with the patient is reluctant to taking narcotics as he thinks that this is going to make him have more urinary obstruction  No chest pain or shortness of breath no nausea    Data reviewed today: I reviewed all medications, new labs and imaging results over the last 24 hours.     Physical Exam   Vital Signs: Temp: 98.1  F (36.7  C) Temp src:  Temporal BP: 126/78 Pulse: 90   Resp: 18 SpO2: 92 % O2 Device: None (Room air) Oxygen Delivery: 1 LPM  Weight: 189 lbs 0 oz      GENERAL: Patient is not in acute distress  HEENT: EOM+,Conjunctiva is clear   NECK:  no Jugular Venous distention  HEART: S1 S2 regular Rate and Rhythm, there is  no murmur,   LUNGS: Respirations are  not laboured, Lungs are  clear to auscultation without Crepitations or Wheezing   ABDOMEN: Soft , there is no tenderness ,Bowel Sounds are  Positive   LOWER LIMBS: no  Pedal Edema  Bilaterally   CNS:  Alert,  Oriented x 3, Moving all the Four Limbs     Data   Recent Labs   Lab 03/29/23  0720 03/28/23  0732   HGB 13.7  --    POTASSIUM  --  3.8         No results found for this or any previous visit (from the past 24 hour(s)).

## 2023-03-30 NOTE — PROGRESS NOTES
"Ortho Rounding Note    S: In bed, pain controlled. LBP/ incisional pain reported. Denies n/v/f/c, SOB, CP.     O:  Vital signs:   Blood pressure 126/78, pulse 90, temperature 98.1  F (36.7  C), temperature source Temporal, resp. rate 18, height 1.715 m (5' 7.5\"), weight 85.7 kg (189 lb), SpO2 92 %.  Estimated body mass index is 29.16 kg/m  as calculated from the following:    Height as of this encounter: 1.715 m (5' 7.5\").    Weight as of this encounter: 85.7 kg (189 lb).      Intake/Output Summary (Last 24 hours) at 3/30/2023 1648  Last data filed at 3/30/2023 0555  Gross per 24 hour   Intake --   Output 2230 ml   Net -2230 ml         Drain intact  Dressings c/d/i  5/5 motor and SPLT in BL UE and LE    A:  POD #2 s/p L4-5 PSF with central decompression     P:  General: Overall slow to mobilize. Continue with planned daily cares and PT. Pull hemovac prior to discharge.   Pain: PO  Act: up ad pina, with therapy  DVT: Mech only  ID: routine postop abx to be completed 24 hours after surgery  Dispo: Plan for discharge to home today with family and order for home PT. OK to DC once passes PT, medically stable, tolerating food, urinating  Appreciate Medicine consult for medical management    Albin Madden PA-C    "

## 2023-03-30 NOTE — PLAN OF CARE
"INPATIENT NOTE: 0700-1930     PRIMARY PROBLEM: L4-L5 Lumbar fusion     Vital Signs: /78 (BP Location: Left arm)   Pulse 90   Temp 98.1  F (36.7  C) (Temporal)   Resp 18   Ht 1.715 m (5' 7.5\")   Wt 85.7 kg (189 lb)   SpO2 92%   BMI 29.16 kg/m           Orientation: A/O x4  Neuro: intact  Pain status: 7-8/10 with movement, denies pain while laying still in bed.   Resp: Lung sounds clear bilaterally, denies any SOB  Cardiac: WDL  GI: Bowel sounds hypoactive all quadrants. Last BM 3/27 as stated by pt - he states he feels constipated, abd tender and nondistended  : voiding spontaneously but not fully emptying bladder - pt declined straight cath for now  Skin: WDL except surgical site, hemovac drain with minimal output  LDA: Peripheral IV SL  Pertinent Labs/Imaging: bladder scan showing residual / post void urine  Diet: regular - family has been bringing food in today  Activity: up with gait belt and walker as tolerated.  Alarms/Safety: All alarms on and audible  Consults: PT, SW  Discharge Plan: to home with wife when pain is controlled - possibly after 1 more PT consult  Discharge Date: 04/01     Pt has been A&O today, Cantonese speaking, wife at bedside. Pt has declined oxycodone until 5pm due to concern for it contributing to his inability to empty his bladder.  He has had tylenol scheduled, robaxin and atarx for pain. He finally agreed to oxycodone this evening due increased pain with movement.   He continues to not be able to empty his bladder when he urinates. This RN suggested a straight cath to drain to prevent infection but pt refused.  He agreed to have stool softener suppository to see if having a BM will help his bladder relax enough to empty. Will revisit the straight cath idea later tonight. Pt had over 300mls pre void and 275 post void.    Pt was cleared by PT and surgery and was going to discharge this afternoon but changed his mind due to increased pain. He was advised by both the surgeon " and this RN that his pain level likely won't change between today and tomorrow - it will take mnay days for the pain to subside.  The pt states concern that he doesn't have the right equipment at home to care for himself ( a bed with rails to support getting out of bed).  Pt will stay the night with plan to discharge tomorrow.  Discharge orders are in.    Mirna Everett RN

## 2023-03-30 NOTE — DISCHARGE INSTRUCTIONS
Incision Instructions     Your incision is covered with an Aquacel dressing. This is a waterproof dressing that you are able to shower with. Do not submerge your dressing in water. You should remove your dressing 7 days following your surgery to inspect your incision for infection: Redness, warmth, drainage.     However, if you notice a significant amount of drainage on your dressing, or there is any concern for possible incision infection, remove to inspect the incision prior to the 7 days.    If there is no concern for infection, cover with simple dressing. We suggest a folded piece of gauze with a few pieces of tape to hold in place. This will allow the incision to remain protected. Please make sure to cover your dressing with plastic/waterproof dressing to keep it waterproof while in the shower. We ask that you continue to waterproof it until you are seen at your two week post op appointment.     Activity Instructions   Minimize bending, lifting, twisting. No lifting greater than 10 lbs. Remember, 1 gallon of milk is 8 lbs.    Please call our office at [phone] should you develop the following issues:  1.) Increased/persistent redness, bleeding, localized warmth, increased swelling, and/or drainage (yellow/clear/odorous) incision site.   2.) Increased pain not controlled with oral pain medications   3.) Persistent headache, dizziness, lightheaded  4.) Persistent constipation despite taking OTC stool softeners as directed  5.) Calf pain/swollen/hard/warm area, swelling chest pain or shortness of breath  6.) Increased/persistent numbness or tingling in arm or legs, weakness in extremities or falls  7.) Generalized feelings of illness  8.) Persistent fever, chills, sweats, Temp 101 or greater  9.) Trouble voiding, incontinence of bowel and/or bladder  10.) Too sleepy-could be amount of pain medication  11.) If unable to wake call 911    Other instructions:  1.) No heavy lifting, nothing more than 6-10lbs.  Minimize your bending, lifting, and twisting. Attempt to avoid prolonged period of sitting. Follow physical therapy restrictions and exercises - slowly increase your activity.   2.) Avoid sitting or laying in one position too long, walk as tolerated, log roll  3.) Wear brace when up per physical therapy, inspect skin under brace daily and call md if sore area starts  4.) Take an over the counter stool softener as directed while on narcotics to prevent constipation or to stay regular. Take a suppository or laxative if no bowel movement in 2 days despite taking softener     Follow Up   Follow up with Albin Madden PA-C in two weeks at [facility]. Please call Vincent [phone] to schedule.      Your home care referral was accepted by [facility]  If you haven't heard from them within the next 24-48 hours,  Please call them at [phone].

## 2023-03-30 NOTE — PLAN OF CARE
Occupational Therapy Discharge Summary    Reason for therapy discharge:    All goals and outcomes met, no further needs identified.    Progress towards therapy goal(s). See goals on Care Plan in [facility] electronic health record for goal details.  Goals met    Therapy recommendation(s):    No further therapy is recommended.

## 2023-03-30 NOTE — PLAN OF CARE
Physical Therapy Discharge Summary    Reason for therapy discharge:    Discharged to home with home therapy.    Progress towards therapy goal(s). See goals on Care Plan in [facility] electronic health record for goal details.  Goals met    Therapy recommendation(s):    Continued therapy is recommended.  Rationale/Recommendations: Pt is below baseline for functional mobility, ROM and strength. Pt would benefit from continued skilled therapy to address these deficits.

## 2023-03-30 NOTE — PROGRESS NOTES
Care Management Initial Consult    General Information  Assessment completed with: Children, Caregiver, VM-chart review, Other ([employer] CM), Denzel CM, dtr  Type of CM/SW Visit: Initial Assessment    Primary Care Provider verified and updated as needed: Yes   Readmission within the last 30 days: no previous admission in last 30 days      Reason for Consult: discharge planning  Advance Care Planning:            Communication Assessment  Patient's communication style: spoken language (non-English)    Hearing Difficulty or Deaf: no   Wear Glasses or Blind: yes    Cognitive  Cognitive/Neuro/Behavioral: WDL  Level of Consciousness: alert  Arousal Level: opens eyes spontaneously  Orientation: oriented x 4  Mood/Behavior: calm, cooperative          Living Environment:   People in home: spouse, child(ralph), adult     Current living Arrangements: house      Able to return to prior arrangements: yes       Family/Social Support:  Care provided by: child(ralph)  Provides care for: no one, unable/limited ability to care for self  Marital Status:   Wife, Children, PCA, Other (specify) (adult day services)          Description of Support System: Supportive, Involved    Support Assessment: Adequate family and caregiver support    Current Resources:   Patient receiving home care services:       Community Resources:    Equipment currently used at home: cane, straight  Supplies currently used at home:      Employment/Financial:  Employment Status:          Financial Concerns:             Lifestyle & Psychosocial Needs:  Social Determinants of Health     Tobacco Use: Medium Risk     Smoking Tobacco Use: Former     Smokeless Tobacco Use: Never     Passive Exposure: Not on file   Alcohol Use: Not on file   Financial Resource Strain: Not on file   Food Insecurity: Not on file   Transportation Needs: Not on file   Physical Activity: Not on file   Stress: Not on file   Social Connections: Not on file   Intimate Partner Violence: Not on  file   Depression: Not on file   Housing Stability: Not on file       Functional Status:  Prior to admission patient needed assistance:   Dependent ADLs:: Ambulation-cane, Bathing, Dressing, Transfers, Toileting  Dependent IADLs:: Cleaning, Shopping, Medication Management, Meal Preparation, Cooking, Laundry  Assesssment of Functional Status: Not at baseline with mobility    Mental Health Status:  Mental Health Status: No Current Concerns       Chemical Dependency Status:  Chemical Dependency Status: No Current Concerns             Values/Beliefs:  Spiritual, Cultural Beliefs, Spiritism Practices, Values that affect care:                 Additional Information:  SW sent [facility] referrals for PT. Dtr/PCA approved of SW sending referrals, no preferred agency.  SW will follow up with dtr/PCA with agency, once obtained.  STEPHANIE Mahoney at [facility] informed JHONY that pt has an elderly waiver open, pt has 3 hrs/daily PCA services which is done by family/dtr. Pt has 4 hrs/homemaking services weekly and up to 4 days a week of adult day services. Denzel states pt does received primary care at [facility]. Denzel provided a list of [facility] agencies in the network.  Referrals have been sent to them.  informed Denzel that it might be difficult to obtain due to insurance.  Denzel did call back later and requested therapy notes be faxed to him since PCA/dtr is requesting additional PCA hours, notes faxed.    Denzel BROWN [facility]: [phone]    Update:  [facility] accepted     Kellie Álvarez, MSW, LICSW, LADC  Inpatient Care Coordination  [facility]  [phone]

## 2023-03-30 NOTE — PLAN OF CARE
Patient vital signs are at baseline: VSS, requiring 1L O2. Wean as able   Patient able to ambulate as they were prior to admission or with assist devices provided by therapies during their stay:  Yes, up with A1 walker, gb  Patient MUST void prior to discharge:  voiding with retention. Straight cath for 700 @2130  Patient able to tolerate oral intake:  Yes  Pain has adequate pain control using Oral analgesics: Yes, given oxycodone, tylenol and robaxin   Does patient have an identified :  Yes. Son/wife  Has goal D/C date and time been discussed with patient:  Yes  Goal Outcome Evaluation:       Pt A&Ox4. Surgical incision CDI. Hemovac in place to gravity. Possible discharge to home tomorrow with home care.

## 2023-03-31 VITALS
BODY MASS INDEX: 28.64 KG/M2 | HEIGHT: 68 IN | RESPIRATION RATE: 18 BRPM | SYSTOLIC BLOOD PRESSURE: 131 MMHG | DIASTOLIC BLOOD PRESSURE: 85 MMHG | WEIGHT: 189 LBS | HEART RATE: 90 BPM | TEMPERATURE: 98.1 F | OXYGEN SATURATION: 94 %

## 2023-03-31 PROCEDURE — 250N000013 HC RX MED GY IP 250 OP 250 PS 637: Performed by: PHYSICIAN ASSISTANT

## 2023-03-31 RX ADMIN — ATENOLOL 25 MG: 25 TABLET ORAL at 09:01

## 2023-03-31 RX ADMIN — HYDROXYZINE HYDROCHLORIDE 10 MG: 10 TABLET ORAL at 02:19

## 2023-03-31 RX ADMIN — Medication 25 MCG: at 09:00

## 2023-03-31 RX ADMIN — DOXAZOSIN 2 MG: 1 TABLET ORAL at 09:00

## 2023-03-31 RX ADMIN — Medication 1 TABLET: at 09:01

## 2023-03-31 RX ADMIN — AMLODIPINE BESYLATE 2.5 MG: 2.5 TABLET ORAL at 09:01

## 2023-03-31 RX ADMIN — Medication 1 TABLET: at 13:06

## 2023-03-31 RX ADMIN — SENNOSIDES AND DOCUSATE SODIUM 1 TABLET: 8.6; 5 TABLET ORAL at 09:00

## 2023-03-31 RX ADMIN — ACETAMINOPHEN 975 MG: 325 TABLET, FILM COATED ORAL at 10:28

## 2023-03-31 RX ADMIN — POLYETHYLENE GLYCOL 3350 17 G: 17 POWDER, FOR SOLUTION ORAL at 09:01

## 2023-03-31 RX ADMIN — ACETAMINOPHEN 975 MG: 325 TABLET, FILM COATED ORAL at 02:19

## 2023-03-31 RX ADMIN — FINASTERIDE 5 MG: 5 TABLET, FILM COATED ORAL at 09:00

## 2023-03-31 ASSESSMENT — ACTIVITIES OF DAILY LIVING (ADL)
ADLS_ACUITY_SCORE: 22
ADLS_ACUITY_SCORE: 20
ADLS_ACUITY_SCORE: 22
ADLS_ACUITY_SCORE: 22

## 2023-03-31 NOTE — PLAN OF CARE
Removed hemovac drain from back. Minimal drainage in vac, dressed with 4x4 gauze and tape per surgeon order.

## 2023-03-31 NOTE — PROGRESS NOTES
Care Management Discharge Note    Discharge Date: 03/31/2023       Discharge Disposition: Home Care    Discharge Services:  C PT  Discharge DME: None    Discharge Transportation: family or friend will provide    Private pay costs discussed: Not applicable    PAS Confirmation Code:    Patient/family educated on Medicare website which has current facility and service quality ratings: no    Education Provided on the Discharge Plan:    Persons Notified of Discharge Plans: dtr/PCA, Denzel, CM   Patient/Family in Agreement with the Plan: yes    Handoff Referral Completed: Yes    Additional Information:  Pt's order for [facility] PT has been sent to agency [facility].  LVM for Denzel, CM for insurance re: pt discharge today and [facility] agency. Discharge at 15:00.    AVS updated    Denzel returned call.  Confirmed info and discharge time.    Kellie Álvarez, TERRY, LICSW, LADC  Inpatient Care Coordination  [facility]  [phone]

## 2023-03-31 NOTE — PLAN OF CARE
Patient's After Visit Summary was reviewed with patient and/or family.   Patient verbalized understanding of After Visit Summary, recommended follow up and was given an opportunity to ask questions.   Discharge medications sent home with patient/family: YES, No   Discharged with son

## 2023-03-31 NOTE — PLAN OF CARE
Goal Outcome Evaluation:      Plan of Care Reviewed With: patient    Overall Patient Progress: improvingOverall Patient Progress: improving     Patient vital signs are at baseline: Yes  Patient able to ambulate as they were prior to admission or with assist devices provided by therapies during their stay:  Yes  Patient MUST void prior to discharge:  Yes; Some post void residual but <300cc.   Patient able to tolerate oral intake:  Yes  Pain has adequate pain control using Oral analgesics:  Yes  Does patient have an identified :  Yes  Has goal D/C date and time been discussed with patient:  Yes    Pt A/O x4. VVS. Cantonese speaking. Pain managed with scheduled tylenol and PRN atarax. Assist x1 with gait belt and back brace. Back brace on when OOB. Voiding, higher post void residual but still <300.  Tolerating regular diet well. CMS intact. Dressing CDI. Plan is home at discharge.

## 2023-04-13 ENCOUNTER — DOCUMENTATION ONLY (OUTPATIENT)
Dept: HOME HEALTH SERVICES | Facility: CLINIC | Age: 73
End: 2023-04-13
Payer: COMMERCIAL

## 2023-04-19 NOTE — DISCHARGE SUMMARY
[facility] Discharge Summary    Mahnaz Stallings MRN# [id number]   Age: 72 year old YOB: 1950     Date of Admission:  3/28/2023  Date of Discharge::  3/31/2023  3:12 PM  Admitting Physician:  Rao Atkins MD  Discharge Physician:  ODESSA ParedesC     Home clinic: [facility]           Admission Diagnoses:   Spinal stenosis, lumbar region, without neurogenic claudication [M48.061]  Lumbar radiculopathy [M54.16]  Spondylolisthesis of lumbar region [M43.16]  Lumbar spondylosis [M47.816]  S/P lumbar fusion [Z98.1]          Discharge Diagnosis:   Patient Active Problem List    Diagnosis     S/P lumbar fusion             Procedures:   Procedure(s): L4-5 Posterior lumbar instrumented fusion with central decompression               Medications Prior to Admission:     No medications prior to admission.             Discharge Medications:     Discharge Medication List as of 3/31/2023 11:13 AM      START taking these medications    Details   acetaminophen (TYLENOL) 325 MG tablet Take 2 tablets (650 mg) by mouth every 4 hours as needed for other (mild pain), Disp-100 tablet, R-0, Local Print      calcium carbonate-vitamin D (OSCAL) 500-5 MG-MCG tablet Take 1 tablet by mouth 3 times daily (before meals), Disp-270 tablet, R-0, Local Print      magnesium hydroxide (MILK OF MAGNESIA) 400 MG/5ML suspension Take 30 mLs by mouth daily as needed for constipation (Use if preventive measures (senna-docusate, docusate, and polyethylene glycol) are not effective.), Disp-354 mL, R-0, Local Print      methocarbamol (ROBAXIN) 500 MG tablet Take 1 tablet (500 mg) by mouth every 6 hours as needed for muscle spasms, Disp-30 tablet, R-0, Local Print      senna-docusate (SENOKOT-S/PERICOLACE) 8.6-50 MG tablet Take 1-2 tablets by mouth 2 times daily Take while on oral narcotics to prevent or treat constipation., Disp-30 tablet, R-0, Local PrintWhile taking narcotics      Vitamin D3 (CHOLECALCIFEROL) 25 mcg  (1000 units) tablet Take 1 tablet (25 mcg) by mouth 2 times daily, Disp-180 tablet, R-0, Local Print         CONTINUE these medications which have CHANGED    Details   oxyCODONE (ROXICODONE) 5 MG tablet Take 1-2 tablets (5-10 mg) by mouth every 6 hours as needed for moderate to severe pain, Disp-26 tablet, R-0, Local Print         CONTINUE these medications which have NOT CHANGED    Details   amLODIPine (NORVASC) 2.5 MG tablet Take 2.5 mg by mouth daily, Historical      atenolol (TENORMIN) 25 MG tablet Take 1 tablet by mouth daily, Historical      doxazosin (CARDURA) 2 MG tablet Take 1 tablet by mouth daily, Historical      finasteride (PROSCAR) 5 MG tablet Take 5 mg by mouth daily, Historical         STOP taking these medications       gabapentin (NEURONTIN) 300 MG capsule Comments:   Reason for Stopping:                     Consultations:   PT, OT, Hospitalist, SW              Hospital Course:   The patient's hospital course was unremarkable.  He recovered as anticipated and experienced no post-operative complications.           Discharge Instructions and Follow-Up:   Discharge diet: Regular   Discharge activity: Activity as tolerated  Up as tolerated   Discharge follow-up: Follow up with Albin Madden PA-C in two weeks at [facility]. Please call Vincent [phone] to schedule.      Wound care: Apply bandage daily  Keep wound clean and dry           Discharge Disposition:   Discharged to home      Attestation:  I have reviewed today's vital signs, notes, medications, labs and imaging.    Albin Madden PA-C

## 2023-04-24 ENCOUNTER — DOCUMENTATION ONLY (OUTPATIENT)
Dept: HOME HEALTH SERVICES | Facility: CLINIC | Age: 73
End: 2023-04-24
Payer: COMMERCIAL

## 2023-07-26 NOTE — PLAN OF CARE
"Patient vital signs are at baseline: Yes  Patient able to ambulate as they were prior to admission or with assist devices provided by therapies during their stay:  Yes  Patient MUST void prior to discharge:  Yes  Patient able to tolerate oral intake:  Yes  Pain has adequate pain control using Oral analgesics:  Yes  Does patient have an identified :  Yes  Has goal D/C date and time been discussed with patient:  Yes, today at 3pm.     /85 (BP Location: Left arm)   Pulse 90   Temp 98.1  F (36.7  C) (Temporal)   Resp 18   Ht 1.715 m (5' 7.5\")   Wt 85.7 kg (189 lb)   SpO2 94%   BMI 29.16 kg/m     " [Follow-up Visit ___] : a follow-up visit  for [unfilled]

## (undated) DEVICE — SOL NACL 0.9% IRRIG 1000ML BOTTLE 2F7124

## (undated) DEVICE — DRAPE X-RAY TUBE 00-901169-01-OEC

## (undated) DEVICE — DRAPE STERI TOWEL LG 1010

## (undated) DEVICE — PACK SMALL SPINE RIDGES

## (undated) DEVICE — LINEN ORTHO ACL PACK 5447

## (undated) DEVICE — SU ETHILON 2-0 PS 18" 585H

## (undated) DEVICE — GLOVE BIOGEL PI MICRO INDICATOR UNDERGLOVE SZ 8.5 48985

## (undated) DEVICE — LINEN POUCH DBL 5427

## (undated) DEVICE — ESU PENCIL W/SMOKE EVAC CVPLP2000

## (undated) DEVICE — SYR 03ML LL W/O NDL 309657

## (undated) DEVICE — LINEN DRAPE 54X72" 5467

## (undated) DEVICE — ESU ELEC BLADE 2.75" COATED/INSULATED E1455

## (undated) DEVICE — SPONGE SURGIFOAM 01GM POWDER 1978

## (undated) DEVICE — DRAPE LAP W/ARMBOARD 29410

## (undated) DEVICE — NDL BLUNT 18GA 1" W/O FILTER 305181

## (undated) DEVICE — GLOVE BIOGEL PI MICRO SZ 8.0 48580

## (undated) DEVICE — DRAIN HEMOVAC RESERVOIR KIT 10FR 1/8" MED 00-2550-002-10

## (undated) DEVICE — DRSG AQUACEL AG 3.5X6.0" HYDROFIBER 412010

## (undated) DEVICE — SU STRATAFIX PDS PLUS 1 CT-1 12" SXPP1A443

## (undated) DEVICE — PAD PROAXIS TABLE KIT SPK10182

## (undated) DEVICE — SU MONOCRYL 3-0 PS-2 27" Y427H

## (undated) DEVICE — PACK SET-UP STD 9102

## (undated) DEVICE — SYR 20ML LL W/O NDL 302830

## (undated) DEVICE — CATH TRAY FOLEY COUDE SURESTEP 16FR W/DRN BAG LATEX A304416A

## (undated) DEVICE — ADH SKIN CLOSURE PREMIERPRO EXOFIN 1.0ML 3470

## (undated) DEVICE — SOL NACL 0.9% 10ML VIAL 0409-4888-02

## (undated) DEVICE — ESU GROUND PAD ADULT W/CORD E7507

## (undated) DEVICE — PREP DURAPREP 26ML APL 8630

## (undated) DEVICE — SUCTION TIP YANKAUER W/O VENT K86

## (undated) DEVICE — DRSG TEGADERM 4X4 3/4" 1626W

## (undated) DEVICE — CATH IV ANGIO INTRO 12GA 382277

## (undated) DEVICE — SUCTION MANIFOLD NEPTUNE 2 SYS 4 PORT 0702-020-000

## (undated) DEVICE — DEVICE DUST COLLECTOR BONE BOX S-3500

## (undated) DEVICE — CUSHION INSERT LG PRONE VIEW JACKSON TABLE

## (undated) DEVICE — SPONGE COTTONOID 1/2X1" 80-1402

## (undated) DEVICE — MARKER SPHERES PASSIVE MEDT PACK 5 8801075

## (undated) DEVICE — SU VICRYL 2-0 CT-1 27" UND J259H

## (undated) DEVICE — TOOL DISSECT MIDAS MR8 14CM MATCH HEAD 3MM MR8-14MH30

## (undated) DEVICE — GOWN IMPERVIOUS SPECIALTY XLG/XLONG 32474

## (undated) DEVICE — SUCTION FRAZIER 12FR W/OBTURATOR 33120

## (undated) RX ORDER — PROPOFOL 10 MG/ML
INJECTION, EMULSION INTRAVENOUS
Status: DISPENSED
Start: 2023-03-28

## (undated) RX ORDER — LIDOCAINE HYDROCHLORIDE 10 MG/ML
INJECTION, SOLUTION EPIDURAL; INFILTRATION; INTRACAUDAL; PERINEURAL
Status: DISPENSED
Start: 2023-03-28

## (undated) RX ORDER — FENTANYL CITRATE-0.9 % NACL/PF 10 MCG/ML
PLASTIC BAG, INJECTION (ML) INTRAVENOUS
Status: DISPENSED
Start: 2023-03-28

## (undated) RX ORDER — NEOSTIGMINE METHYLSULFATE 1 MG/ML
VIAL (ML) INJECTION
Status: DISPENSED
Start: 2023-03-28

## (undated) RX ORDER — BUPIVACAINE HYDROCHLORIDE AND EPINEPHRINE 2.5; 5 MG/ML; UG/ML
INJECTION, SOLUTION EPIDURAL; INFILTRATION; INTRACAUDAL; PERINEURAL
Status: DISPENSED
Start: 2023-03-28

## (undated) RX ORDER — CEFAZOLIN SODIUM/WATER 2 G/20 ML
SYRINGE (ML) INTRAVENOUS
Status: DISPENSED
Start: 2023-03-28

## (undated) RX ORDER — DEXAMETHASONE SODIUM PHOSPHATE 4 MG/ML
INJECTION, SOLUTION INTRA-ARTICULAR; INTRALESIONAL; INTRAMUSCULAR; INTRAVENOUS; SOFT TISSUE
Status: DISPENSED
Start: 2023-03-28

## (undated) RX ORDER — FENTANYL CITRATE 50 UG/ML
INJECTION, SOLUTION INTRAMUSCULAR; INTRAVENOUS
Status: DISPENSED
Start: 2023-03-28

## (undated) RX ORDER — ONDANSETRON 2 MG/ML
INJECTION INTRAMUSCULAR; INTRAVENOUS
Status: DISPENSED
Start: 2023-03-28

## (undated) RX ORDER — GLYCOPYRROLATE 0.2 MG/ML
INJECTION INTRAMUSCULAR; INTRAVENOUS
Status: DISPENSED
Start: 2023-03-28

## (undated) RX ORDER — TRANEXAMIC ACID 10 MG/ML
INJECTION, SOLUTION INTRAVENOUS
Status: DISPENSED
Start: 2023-03-28

## (undated) RX ORDER — GABAPENTIN 100 MG/1
CAPSULE ORAL
Status: DISPENSED
Start: 2023-03-28

## (undated) RX ORDER — EPHEDRINE SULFATE 50 MG/ML
INJECTION, SOLUTION INTRAMUSCULAR; INTRAVENOUS; SUBCUTANEOUS
Status: DISPENSED
Start: 2023-03-28